# Patient Record
Sex: FEMALE | Race: BLACK OR AFRICAN AMERICAN | Employment: FULL TIME | URBAN - METROPOLITAN AREA
[De-identification: names, ages, dates, MRNs, and addresses within clinical notes are randomized per-mention and may not be internally consistent; named-entity substitution may affect disease eponyms.]

---

## 2019-03-19 ENCOUNTER — HOSPITAL ENCOUNTER (OUTPATIENT)
Dept: PHYSICAL THERAPY | Age: 54
Discharge: HOME | End: 2019-03-19
Payer: COMMERCIAL

## 2019-03-19 PROCEDURE — 97110 THERAPEUTIC EXERCISES: CPT

## 2019-03-19 PROCEDURE — 97161 PT EVAL LOW COMPLEX 20 MIN: CPT

## 2019-03-19 NOTE — PROGRESS NOTES
PT INITIAL EVALUATION NOTE - [facility] 2-15 Patient Name: Horacio Cochran Date:3/19/2019 : 1965 [x]  Patient  Verified Payor: BLUE CROSS / Plan: [place] / Product Type: PPO / In time:1:54 pm  Out time:2:48 pm 
Total Treatment Time (min): 54 minutes Total Timed Codes (min): 13 minutes 1:1 Treatment Time (MC only): 13 minutes Visit #: 1 Treatment Area: Neck pain [M54.2] SUBJECTIVE Pain Level (0-10 scale): 8/10 Any medication changes, allergies to medications, adverse drug reactions, diagnosis change, or new procedure performed?: [] No    [x] Yes (see summary sheet for update) Subjective: Pt was referred to skilled PT for right shoulder pain. Today, Pt reports primary complaints of neck and right superior shoulder pain. She also experiences tightness in her R arm and numbness/tingling in RUE down to right hand, particularly into the 5th digit. She also notices intermittent burning sensation in right scapular region. She reports recent increased RUE and  weakness. Mechanism of Injury: Insidious left low back pain began initially 2 weeks ago; 2 days later, her R arm started to ache and noticed increased tenseness within neck/bilateral shoulders followed by severe pain in upper back/scapular region. Another 2 days later, she went to [facility] who performed x-rays of neck and shoulder which were negative. She no longer has any low back pain. Aggravating factors include lying on R>L sides, extending neck, prolonged sitting, early morning, typing. Relieving factors include lying supine, medication somewhat (Gabapentin and Naproxen). PLOF: Sedentary lifestyle. 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   
Gait and Functional Mobility:  -- 
Palpation: TTP T1>C6-C7 spinous processes; TTP R superior shoulder, infraspinatus, cervical paraspinals R>L  Strength 
L: 47.4, 45.6, 41.3 (44.7) 
R: 19.4, 22.9, 22.2 (21.5) Cervical AROM:   
    R    L Flexion    WNL pulling R neck Extension   20 p! R neck Side Bending   25    25 pulling on R Rotation   50 pinch R neck  55 pulling R neck R Shoulder ROM:   AROM     PROM Flexion   125 RUE (WNL on L)   WNL Abduction  97 p! R scapular region (WNL L) WNL    
 IR   Hand to T10 (T7 on L) ER   Hand to T2 (T5 on L) UPPER QUARTER   MUSCLE STRENGTH 
KEY       R  L 
0 - No Contraction  C1, C2 Neck Flex 5  5 
1 - Trace   C3 Side Flex  5  5 
2 - Poor   C4 Sh Elev  5  5 
3 - Fair    C5 Deltoid/Biceps 4   5 
4 - Good   C6 Wrist Ext  4+  5 
5 - Normal   C7 Triceps   4  5 
    C7 Wrist Flex  4-  5 C8 Thumb Ext  3  5 
    T1 Hand Inst  3  5 Flexibility: Decreased upper trap flexibility bilaterally R>L Mobility Assessment: Decreased mid-lower thoracic spine mobility Neurological: Reflexes / Sensations: Will assess next visit Special Tests: Cervical Distraction: (-)  Cervical Compression: (-) Spurling Test: (+) Burning superior R shoulder   
  (+) Ulnar and median nerve ULTT; (-) Radial  
 
13 min Therapeutic Exercise:  [] See flow sheet :  
Rationale: increase ROM and increase strength to improve the patients ability to perform ADLs with decreased pain or discomfort. With 
 [x] TE 
 [] TA 
 [] neuro 
 [] other: Patient Education: [x] Review HEP [] Progressed/Changed HEP based on:  
[] positioning   [] body mechanics   [] transfers   [] heat/ice application   
[x] other: Educated Pt regarding impairments, role of PT, and POC. Provided Pt education regarding postural awareness and impact on her condition. Other Objective/Functional Measures: FOTO Score:  
Shoulder: 54/100 Neck: 68/100 Pain Level (0-10 scale) post treatment: 7/10 ASSESSMENT/Changes in Function:  
 
[x]  See Plan of Care [facility] 3/19/2019

## 2019-03-19 NOTE — PROGRESS NOTES
[facility] Physical Therapy [id number] [address], [address] Phone: [phone]  Fax: [phone] Plan of Care/Statement of Necessity for Physical Therapy Services  2-15 Patient name: Livier Soler  : 1965  Provider#: [id number] Referral source: Satish Quiroz [facility]/Treatment Diagnosis: Neck pain [M54.2] Prior Hospitalization: see medical history Comorbidities: Back pain, BMI>30, Diabetes Type II 
Prior Level of Function: Patient completed 20 minutes of exercise seldom or never. Medications: Verified on Patient Summary List 
 
Start of Care: 3/19/19      Onset Date: 2 weeks prior The Plan of Care and following information is based on the information from the initial evaluation. Assessment/ key information: Pt is a pleasant 52year old female who was referred to skilled PT for cervical radiculopathy. Based on examination, patient presents with symptoms consistent with right-sided C7-T1 cervical radiculopathy and postural syndrome which have resulted in impaired sensation, upper extremity and  strength, as well as cervical/glenohumeral ROM. Evaluation Complexity History MEDIUM  Complexity : 1-2 comorbidities / personal factors will impact the outcome/ POC ; Examination MEDIUM Complexity : 3 Standardized tests and measures addressing body structure, function, activity limitation and / or participation in recreation  ;Presentation LOW Complexity : Stable, uncomplicated  ;Clinical Decision Making MEDIUM Complexity : FOTO score of 26-74 Overall Complexity Rating: LOW Problem List: pain affecting function, decrease ROM, decrease strength, decrease ADL/ functional abilitiies, decrease activity tolerance and decrease flexibility/ joint mobility Treatment Plan may include any combination of the following: Therapeutic exercise, Therapeutic activities, Neuromuscular re-education, Physical agent/modality, Manual therapy, Patient education and Functional mobility training Patient / Family readiness to learn indicated by: asking questions, trying to perform skills and interest 
Persons(s) to be included in education: patient (P) Barriers to Learning/Limitations: None Patient Goal (s): Pain relief and feeling back in hand Patient Self Reported Health Status: good Rehabilitation Potential: good Short Term Goals: To be accomplished in 6 treatments: 1. Pt will be independent and compliant with HEP. 2. Pt will report improved sitting and standing postural awareness. 3. Pt will increase right glenohumeral abduction AROM to >/= 115 degrees. 4. Pt will increase right glenohumeral flexion AROM to >/= 140 degrees. Long Term Goals: To be accomplished in 12 treatments: 1. Pt will be independent and compliant with HEP. 2. Pt will improve FOTO score by the MCID from 68/100 to 79/100 demonstrating improved overall function with decreased pain or discomfort. 3. Pt will improve right  strength from 21.5# to an average of >/= 35#. 4. Pt will increase right glenohumeral flexion/abduction AROM to >/= 150 degrees. 5. Pt will increase cervical rotation to >/= 65 degrees bilaterally. 6.  Pt will be able to tolerate full work shifts including typing without complaints of neck pain or radicular symptoms. Frequency / Duration: Patient to be seen 1-2 times per week for 12 treatments. Patient/ Caregiver education and instruction: self care, activity modification and exercises 
 
[x]  Plan of care has been reviewed with JILLIAN Grimes 3/19/2019  
 
________________________________________________________________________ I certify that the above Therapy Services are being furnished while the patient is under my care. I agree with the treatment plan and certify that this therapy is necessary. [de-identified] Signature:____________________  Date:____________Time: _________

## 2019-03-22 ENCOUNTER — HOSPITAL ENCOUNTER (OUTPATIENT)
Dept: PHYSICAL THERAPY | Age: 54
Discharge: HOME | End: 2019-03-22
Payer: COMMERCIAL

## 2019-03-22 PROCEDURE — 97110 THERAPEUTIC EXERCISES: CPT

## 2019-03-22 PROCEDURE — 97140 MANUAL THERAPY 1/> REGIONS: CPT

## 2019-03-22 NOTE — PROGRESS NOTES
PT DAILY TREATMENT NOTE - [facility] 2-15 Patient Name: Horacio Cochran Date:3/22/2019 : 1965 [x]  Patient  Verified Payor: BLUE CROSS / [place]: [place] [employer] / Product Type: PPO / In time:9:35 am  Out time:10:44 am 
Total Treatment Time (min): 69 minutes Total Timed Codes (min): 59 minutes 1:1 Treatment Time (MC only): 55 minutes Visit #: 2 Treatment Area: Neck pain [M54.2] Right shoulder pain [M25.511] SUBJECTIVE Pain Level (0-10 scale): 10/10 Any medication changes, allergies to medications, adverse drug reactions, diagnosis change, or new procedure performed?: [x] No    [] Yes (see summary sheet for update) Subjective functional status/changes:   [] No changes reported Pt reports \"a ball of fire\"/burning sensation in right posterior shoulder and pain traveling down RUE to right wrist with numbness in pinky and tingling in 4th finger for the past couple of days. She notes her neck pain and movement has gotten better. She did her exercises 1 time since evaluation. OBJECTIVE Modality rationale: decrease pain and increase tissue extensibility to improve the patients ability to perform ADLs with decreased pain or discomfort. Min Type Additional Details  
  
 [] Estim: []Att   []Unatt    []TENS instruct []IFC  []Premod   []NMES []Other:  []w/US   []w/ice   []w/heat Position: Location:  
  
 []  Traction: [] Cervical       []Lumbar 
                     [] Prone          []Supine []Intermittent   []Continuous Lbs: 
[] before manual 
[] after manual 
[]w/heat  
 []  Ultrasound: []Continuous   [] Pulsed  
                    at: []1MHz   []3MHz Location: 
W/cm2:  
 [] Paraffin Location:  
[]w/heat  
10 []  Ice     [x]  Heat 
[]  Ice massage Position: Supine Location: Cervical  
 []  Laser 
[]  Other: Position: Location:  
 
 []  Vasopneumatic Device Pressure:       [] lo [] med [] hi  
Temperature: [x] Skin assessment post-treatment:  [x]intact []redness- no adverse reaction 
  []redness  adverse reaction:  
 
42 min Therapeutic Exercise:  [] See flow sheet :  
Rationale: increase ROM and increase strength to improve the patients ability to perform ADLs with decreased pain or discomfort. 17 min Manual Therapy: Cervical traction; MFR of right upper trap; ulnar and median passive nerve glide Rationale: decrease pain, increase ROM and increase tissue extensibility to improve the patients ability to perform ADLs with decreased pain or discomfort. With 
 [x] TE 
 [] TA 
 [] neuro 
 [] other: Patient Education: [x] Review HEP [] Progressed/Changed HEP based on:  
[] positioning   [] body mechanics   [] transfers   [] heat/ice application   
[] other:   
 
Other Objective/Functional Measures:   
 
Pain Level (0-10 scale) post treatment: 8/10 ASSESSMENT/Changes in Function:  
Pt noted increased irritation in elbow with ulnar passive and active nerve glides, though decreased symptomatic pain more distal in right forearm, evidence of centralizing symptoms. Corrected technique for cervical retractions to avoid lifting head off table and to push head posteriorly while maintaining chin tuck. Added prone scapular retractions and standing rows/shoulder extensions to strengthen periscapular musculature and facilitate improved sitting/standing posture. She finished session without any symptoms in right forearm, though still throbbing pain in right elbow and mild discomfort in medial/posterior upper arm. Patient will continue to benefit from skilled PT services to modify and progress therapeutic interventions, address functional mobility deficits, address ROM deficits, address strength deficits, analyze and address soft tissue restrictions, analyze and cue movement patterns, analyze and modify body mechanics/ergonomics and assess and modify postural abnormalities to attain remaining goals. [x]  See Plan of Care 
[]  See progress note/recertification 
[]  See Discharge Summary Progress towards goals / Updated goals: 
Short Term Goals: To be accomplished in 6 treatments: 1. Pt will be independent and compliant with HEP. 2. Pt will report improved sitting and standing postural awareness. 3. Pt will increase right glenohumeral abduction AROM to >/= 115 degrees. 4. Pt will increase right glenohumeral flexion AROM to >/= 140 degrees. Long Term Goals: To be accomplished in 12 treatments: 1. Pt will be independent and compliant with HEP. 2. Pt will improve FOTO score by the MCID from 68/100 to 79/100 demonstrating improved overall function with decreased pain or discomfort. 3. Pt will improve right  strength from 21.5# to an average of >/= 35#. 4. Pt will increase right glenohumeral flexion/abduction AROM to >/= 150 degrees. 5. Pt will increase cervical rotation to >/= 65 degrees bilaterally. 6.  Pt will be able to tolerate full work shifts including typing without complaints of neck pain or radicular symptoms. PLAN [x]  Upgrade activities as tolerated     [x]  Continue plan of care 
[]  Update interventions per flow sheet      
[]  Discharge due to:_ 
[]  Other:_ Perry Barros 3/22/2019

## 2019-03-25 ENCOUNTER — HOSPITAL ENCOUNTER (OUTPATIENT)
Dept: PHYSICAL THERAPY | Age: 54
Discharge: HOME | End: 2019-03-25
Payer: COMMERCIAL

## 2019-03-25 PROCEDURE — 97140 MANUAL THERAPY 1/> REGIONS: CPT

## 2019-03-25 PROCEDURE — 97014 ELECTRIC STIMULATION THERAPY: CPT

## 2019-03-25 PROCEDURE — 97110 THERAPEUTIC EXERCISES: CPT

## 2019-03-25 NOTE — PROGRESS NOTES
PT DAILY TREATMENT NOTE - [facility] 2-15 Patient Name: Mitch Jackson Date:3/25/2019 : 1965 [x]  Patient  Verified Payor: BLUE CROSS / [place]: [place] [facility] / Product Type: PPO / In time:10:40 am  Out time:11:58 am 
Total Treatment Time (min): 78 minutes Total Timed Codes (min): 63 minutes 1:1 Treatment Time ( only): 43 minutes Visit #: 3 Treatment Area: Neck pain [M54.2] SUBJECTIVE Pain Level (0-10 scale): 5.5/10 Any medication changes, allergies to medications, adverse drug reactions, diagnosis change, or new procedure performed?: [x] No    [] Yes (see summary sheet for update) Subjective functional status/changes:   [] No changes reported Pt reports she woke up last night due to throbbing pain in right elbow; she notes continued burning in right posterior shoulder. She still experiences complete numbness in 5th digit and lateral 4th digit. Symptoms are worse with prolonged sitting. OBJECTIVE Modality rationale: decrease pain and increase tissue extensibility to improve the patients ability to perform ADLs with decreased pain or discomfort. Min Type Additional Details 15 [x] Estim: []Att   [x]Unatt    [x]TENS instruct [x]IFC  []Premod   []NMES []Other:  []w/US   []w/ice   [x]w/heat Position: Supine Location: Cervical   
  
 []  Traction: [] Cervical       []Lumbar 
                     [] Prone          []Supine []Intermittent   []Continuous Lbs: 
[] before manual 
[] after manual 
[]w/heat  
 []  Ultrasound: []Continuous   [] Pulsed  
                    at: []1MHz   []3MHz Location: 
W/cm2:  
 [] Paraffin Location:  
[]w/heat  
 []  Ice     []  Heat 
[]  Ice massage Position: Location:  
 []  Laser 
[]  Other: Position: Location:  
 
 []  Vasopneumatic Device Pressure:       [] lo [] med [] hi  
Temperature:   
 
[x] Skin assessment post-treatment:  [x]intact []redness- no adverse reaction 
  []redness  adverse reaction:  
 
46 min Therapeutic Exercise:  [] See flow sheet :  
Rationale: increase ROM and increase strength to improve the patients ability to perform ADLs with decreased pain or discomfort. 17 min Manual Therapy: Cervical traction; MFR of right upper trap; ulnar and median passive nerve glide; STM and MFR of right infraspinatus in prone Rationale: decrease pain, increase ROM and increase tissue extensibility to improve the patients ability to perform ADLs with decreased pain or discomfort. With 
 [x] TE 
 [] TA 
 [] neuro 
 [] other: Patient Education: [x] Review HEP [] Progressed/Changed HEP based on:  
[] positioning   [] body mechanics   [] transfers   [] heat/ice application   
[] other:   
 
Other Objective/Functional Measures:   
 
Pain Level (0-10 scale) post treatment: 4/10 ASSESSMENT/Changes in Function:  
Corrected form with cervical retractions with verbal/tactile cues to maintain chin tuck, to properly activate deep neck flexors. Added pec stretch to promote better posture and decrease rounded shoulders posture. Also added side-lying ER to target infraspinatus musculature and facilitate healing/strengthening of tissue; Pt TTP in musculature and performed STM to decrease burning sensation and referred pain. Patient will continue to benefit from skilled PT services to modify and progress therapeutic interventions, address functional mobility deficits, address ROM deficits, address strength deficits, analyze and address soft tissue restrictions, analyze and cue movement patterns, analyze and modify body mechanics/ergonomics and assess and modify postural abnormalities to attain remaining goals. [x]  See Plan of Care 
[]  See progress note/recertification 
[]  See Discharge Summary Progress towards goals / Updated goals: 
Short Term Goals: To be accomplished in 6 treatments: 1. Pt will be independent and compliant with HEP. 2. Pt will report improved sitting and standing postural awareness. 3. Pt will increase right glenohumeral abduction AROM to >/= 115 degrees. 4. Pt will increase right glenohumeral flexion AROM to >/= 140 degrees. Long Term Goals: To be accomplished in 12 treatments: 1. Pt will be independent and compliant with HEP. 2. Pt will improve FOTO score by the MCID from 68/100 to 79/100 demonstrating improved overall function with decreased pain or discomfort. 3. Pt will improve right  strength from 21.5# to an average of >/= 35#. 4. Pt will increase right glenohumeral flexion/abduction AROM to >/= 150 degrees. 5. Pt will increase cervical rotation to >/= 65 degrees bilaterally. 6.  Pt will be able to tolerate full work shifts including typing without complaints of neck pain or radicular symptoms. PLAN [x]  Upgrade activities as tolerated     [x]  Continue plan of care 
[]  Update interventions per flow sheet      
[]  Discharge due to:_ 
[]  [facility]

## 2019-03-29 ENCOUNTER — HOSPITAL ENCOUNTER (OUTPATIENT)
Dept: PHYSICAL THERAPY | Age: 54
Discharge: HOME | End: 2019-03-29
Payer: COMMERCIAL

## 2019-03-29 PROCEDURE — 97110 THERAPEUTIC EXERCISES: CPT

## 2019-03-29 PROCEDURE — 97140 MANUAL THERAPY 1/> REGIONS: CPT

## 2019-03-29 PROCEDURE — 97014 ELECTRIC STIMULATION THERAPY: CPT
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
## 2019-04-02 ENCOUNTER — HOSPITAL ENCOUNTER (OUTPATIENT)
Dept: PHYSICAL THERAPY | Age: 54
Discharge: HOME | End: 2019-04-02
Payer: COMMERCIAL

## 2019-04-02 PROCEDURE — 97140 MANUAL THERAPY 1/> REGIONS: CPT

## 2019-04-02 PROCEDURE — 97014 ELECTRIC STIMULATION THERAPY: CPT

## 2019-04-02 PROCEDURE — 97110 THERAPEUTIC EXERCISES: CPT

## 2019-04-02 NOTE — PROGRESS NOTES
PT DAILY TREATMENT NOTE - [facility] -15 Patient Name: Hang Boss Date:2019 : 1965 [x]  Patient  Verified Payor: BLUE CROSS / Plan: [facility] [employer] / Product Type: PPO / In time:4:13P  Out time:5:07P Total Treatment Time (min): 54 Total Timed Codes (min): 39 
1:1 Treatment Time ( [address] only): 29 Visit #: 4 Treatment Area: Neck pain [M54.2] SUBJECTIVE Pain Level (0-10 scale): 8.5/10 Any medication changes, allergies to medications, adverse drug reactions, diagnosis change, or new procedure performed?: [x] No    [] Yes (see summary sheet for update) Subjective functional status/changes:   [] No changes reported Pt reports that she had some relief for a few hours after last session. Pt reported last  she felt an increase in pressure in her hand \"like someone is filling her hand up with air\". Still having numbness and tingling in lat 2 digits. OBJECTIVE Modality rationale: decrease pain and increase tissue extensibility to improve the patients ability to perform ADLs with decreased pain or discomfort. Min Type Additional Details 15 [x] Estim: []Att   [x]Unatt    [x]TENS instruct [x]IFC  []Premod   []NMES []Other:  []w/US   []w/ice   [x]w/heat Position: Supine Location: Cervical   
  
 []  Traction: [] Cervical       []Lumbar 
                     [] Prone          []Supine []Intermittent   []Continuous Lbs: 
[] before manual 
[] after manual 
[]w/heat  
 []  Ultrasound: []Continuous   [] Pulsed  
                    at: []1MHz   []3MHz Location: 
W/cm2:  
 [] Paraffin Location:  
[]w/heat  
 []  Ice     []  Heat 
[]  Ice massage Position: Location:  
 []  Laser 
[]  Other: Position: Location:  
 
 []  Vasopneumatic Device Pressure:       [] lo [] med [] hi  
Temperature:   
 
[x] Skin assessment post-treatment:  [x]intact []redness- no adverse reaction 
  []redness  adverse reaction: 14 min Therapeutic Exercise:  [x] See flow sheet :   
Rationale: increase ROM and increase strength to improve the patients ability to perform ADLs with decreased pain or discomfort. 15 min Manual Therapy: Cervical traction; MFR of right upper trap; ulnar and median passive nerve glide; STM and MFR of right infraspinatus Rationale: decrease pain, increase ROM and increase tissue extensibility to improve the patients ability to perform ADLs with decreased pain or discomfort. With 
 [x] TE 
 [] TA 
 [] neuro 
 [] other: Patient Education: [x] Review HEP [] Progressed/Changed HEP based on:  
[] positioning   [] body mechanics   [] transfers   [] heat/ice application   
[] other:   
 
Other Objective/Functional Measures: --  
 
Pain Level (0-10 scale) post treatment: 6/10 ASSESSMENT/Changes in Function:  
Pt reported relief in pain with manual but no change in radicular symptoms. Pt stated she has a FU with MD on Friday and will see about next steps. Patient will continue to benefit from skilled PT services to modify and progress therapeutic interventions, address functional mobility deficits, address ROM deficits, address strength deficits, analyze and address soft tissue restrictions, analyze and cue movement patterns, analyze and modify body mechanics/ergonomics and assess and modify postural abnormalities to attain remaining goals. [x]  See Plan of Care 
[]  See progress note/recertification 
[]  See Discharge Summary Progress towards goals / Updated goals: 
Short Term Goals: To be accomplished in 6 treatments: 1. Pt will be independent and compliant with HEP. 2. Pt will report improved sitting and standing postural awareness. 3. Pt will increase right glenohumeral abduction AROM to >/= 115 degrees. 4. Pt will increase right glenohumeral flexion AROM to >/= 140 degrees. Long Term Goals: To be accomplished in 12 treatments: 1. Pt will be independent and compliant with HEP. 2. Pt will improve FOTO score by the MCID from 68/100 to 79/100 demonstrating improved overall function with decreased pain or discomfort. 3. Pt will improve right  strength from 21.5# to an average of >/= 35#. 4. Pt will increase right glenohumeral flexion/abduction AROM to >/= 150 degrees. 5. Pt will increase cervical rotation to >/= 65 degrees bilaterally. 6.  Pt will be able to tolerate full work shifts including typing without complaints of neck pain or radicular symptoms. PLAN [x]  Upgrade activities as tolerated     [x]  Continue plan of care 
[]  Update interventions per flow sheet      
[]  Discharge due to:_ 
[]  Other:_ Narda Enamorado PTA, OPT[facility] 4/2/2019

## 2019-04-03 ENCOUNTER — APPOINTMENT (OUTPATIENT)
Dept: PHYSICAL THERAPY | Age: 54
End: 2019-04-03
Payer: COMMERCIAL
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
## 2019-04-05 ENCOUNTER — APPOINTMENT (OUTPATIENT)
Dept: PHYSICAL THERAPY | Age: 54
End: 2019-04-05
Payer: COMMERCIAL

## 2019-04-09 ENCOUNTER — HOSPITAL ENCOUNTER (OUTPATIENT)
Dept: PHYSICAL THERAPY | Age: 54
Discharge: HOME | End: 2019-04-09
Payer: COMMERCIAL

## 2019-04-09 PROCEDURE — 97140 MANUAL THERAPY 1/> REGIONS: CPT

## 2019-04-09 NOTE — PROGRESS NOTES
PT DAILY TREATMENT NOTE - [facility] 2-15 Patient Name: Kevin Fletcher Date:2019 : 1965 [x]  Patient  Verified Payor: BLUE CROSS / Plan: [facility] BL[facility]OSS [employer] / Product Type: PPO / In time: 5:48P  Out time:6:40P Total Treatment Time (min): 52 minutes Total Timed Codes (min): 52 minutes 1:1 Treatment Time ( only): 25 minutes Visit #: 6 Treatment Area: Neck pain [M54.2] SUBJECTIVE Pain Level (0-10 scale): 7/10 Any medication changes, allergies to medications, adverse drug reactions, diagnosis change, or new procedure performed?: [x] No    [] Yes (see summary sheet for update) Subjective functional status/changes:   [] No changes reported Pt reports she has an MRI scheduled for Thursday. She woke up last night at 1 am due to severe throbbing pain in right elbow down to hand and was unable to get back to sleep due to discomfort. She continues to having burning pain in right>left shoulders. OBJECTIVE 27 min Therapeutic Exercise:  [x] See flow sheet :   
Rationale: increase ROM and increase strength to improve the patients ability to perform ADLs with decreased pain or discomfort. 25 min Manual Therapy: Cervical traction; MFR of right upper trap and SCM/scalenes; ulnar and median passive nerve glides in supine Rationale: decrease pain, increase ROM and increase tissue extensibility to improve the patients ability to perform ADLs with decreased pain or discomfort. With 
 [x] TE 
 [] TA 
 [] neuro 
 [] other: Patient Education: [x] Review HEP [] Progressed/Changed HEP based on:  
[] positioning   [] body mechanics   [] transfers   [] heat/ice application   
[] other:   
 
Other Objective/Functional Measures: --  
 
Pain Level (0-10 scale) post treatment: 6/10 ASSESSMENT/Changes in Function:  
Manual treatment helped relieve severity of elbow pain, but numbness/hypersensitivity remained in the 5th>4th digits.  Reviewed ulnar nerve glides with cuing to perform in single-joint fashion, increasing shoulder abduction as tolerated. Will assess results of MRI, confer with MD, and update POC accordingly. Patient will continue to benefit from skilled PT services to modify and progress therapeutic interventions, address functional mobility deficits, address ROM deficits, address strength deficits, analyze and address soft tissue restrictions, analyze and cue movement patterns, analyze and modify body mechanics/ergonomics and assess and modify postural abnormalities to attain remaining goals. [x]  See Plan of Care 
[]  See progress note/recertification 
[]  See Discharge Summary Progress towards goals / Updated goals: 
Short Term Goals: To be accomplished in 6 treatments: 1. Pt will be independent and compliant with HEP. 2. Pt will report improved sitting and standing postural awareness. 3. Pt will increase right glenohumeral abduction AROM to >/= 115 degrees. 4. Pt will increase right glenohumeral flexion AROM to >/= 140 degrees. Long Term Goals: To be accomplished in 12 treatments: 1. Pt will be independent and compliant with HEP. 2. Pt will improve FOTO score by the MCID from 68/100 to 79/100 demonstrating improved overall function with decreased pain or discomfort. 3. Pt will improve right  strength from 21.5# to an average of >/= 35#. 4. Pt will increase right glenohumeral flexion/abduction AROM to >/= 150 degrees. 5. Pt will increase cervical rotation to >/= 65 degrees bilaterally. 6.  Pt will be able to tolerate full work shifts including typing without complaints of neck pain or radicular symptoms. PLAN [x]  Upgrade activities as tolerated     [x]  Continue plan of care 
[]  Update interventions per flow sheet      
[]  Discharge due to:_ 
[]  Other:_   
 
 Monserrat Rodriguez, PT, DPT 4/9/2019

## 2019-04-11 ENCOUNTER — HOSPITAL ENCOUNTER (OUTPATIENT)
Dept: PHYSICAL THERAPY | Age: 54
Discharge: HOME | End: 2019-04-11
Payer: COMMERCIAL

## 2019-04-11 PROCEDURE — 97140 MANUAL THERAPY 1/> REGIONS: CPT

## 2019-04-11 NOTE — PROGRESS NOTES
PT DAILY TREATMENT NOTE - [facility] 2-15 Patient Name: Maryse Esquivel Date:2019 : 1965 [x]  Patient  Verified Payor: BLUE CROSS / Plan: [facility] BLUE [place]OSS [employer] / Product Type: PPO / In time: 5:49 P  Out time: 6:40 P Total Treatment Time (min): 51 minutes Total Timed Codes (min): 51 minutes 1:1 Treatment Time ([facility] only): 30 minutes Visit #: 3 Treatment Area: Neck pain [M54.2] SUBJECTIVE Pain Level (0-10 scale): 7/10 Any medication changes, allergies to medications, adverse drug reactions, diagnosis change, or new procedure performed?: [x] No    [] Yes (see summary sheet for update) Subjective functional status/changes:   [] No changes reported Pt had her MRI this morning at [facility] and feels \"out of it\" as a result. She has felt about the same and only experienced temporary relief after last session. OBJECTIVE 26 min Therapeutic Exercise:  [x] See flow sheet :   
Rationale: increase ROM and increase strength to improve the patients ability to perform ADLs with decreased pain or discomfort. 25 min Manual Therapy: Cervical traction; MFR of right upper trap and SCM/scalenes; ulnar and median passive nerve glides in supine; Grade III P-A mobilizations of C7 and T1 spinous processes in prone Rationale: decrease pain, increase ROM and increase tissue extensibility to improve the patients ability to perform ADLs with decreased pain or discomfort. With 
 [x] TE 
 [] TA 
 [] neuro 
 [] other: Patient Education: [x] Review HEP [] Progressed/Changed HEP based on:  
[] positioning   [] body mechanics   [] transfers   [] heat/ice application   
[] other:   
 
Other Objective/Functional Measures: --  
 
Pain Level (0-10 scale) post treatment: 5/10 ASSESSMENT/Changes in Function:  
Pt noted significant tenderness with palpation of C7 and T1 spinous processes in prone, though was able to tolerate grade III P-A mobilizations and reported relieved tension/irritation in right forearm and tingling in 4th/5th digits post-manual therapy today. Attempted repetitive movements with cervical extension as she noted increased radiating symptoms with cervical flexion; will continue to monitor response to repetitive movement going forward. Patient will continue to benefit from skilled PT services to modify and progress therapeutic interventions, address functional mobility deficits, address ROM deficits, address strength deficits, analyze and address soft tissue restrictions, analyze and cue movement patterns, analyze and modify body mechanics/ergonomics and assess and modify postural abnormalities to attain remaining goals. [x]  See Plan of Care 
[]  See progress note/recertification 
[]  See Discharge Summary Progress towards goals / Updated goals: 
Short Term Goals: To be accomplished in 6 treatments: 1. Pt will be independent and compliant with HEP. 2. Pt will report improved sitting and standing postural awareness. 3. Pt will increase right glenohumeral abduction AROM to >/= 115 degrees. 4. Pt will increase right glenohumeral flexion AROM to >/= 140 degrees. Long Term Goals: To be accomplished in 12 treatments: 1. Pt will be independent and compliant with HEP. 2. Pt will improve FOTO score by the MCID from 68/100 to 79/100 demonstrating improved overall function with decreased pain or discomfort. 3. Pt will improve right  strength from 21.5# to an average of >/= 35#. 4. Pt will increase right glenohumeral flexion/abduction AROM to >/= 150 degrees. 5. Pt will increase cervical rotation to >/= 65 degrees bilaterally. 6.  Pt will be able to tolerate full work shifts including typing without complaints of neck pain or radicular symptoms.  
              
 
PLAN 
 [x]  Upgrade activities as tolerated     [x]  Continue plan of care 
[]  Update interventions per flow sheet      
[]  Discharge due to:_ 
[]  Other:_ Denise Mora PT, DPT 4/11/2019

## 2019-04-15 ENCOUNTER — HOSPITAL ENCOUNTER (OUTPATIENT)
Dept: PHYSICAL THERAPY | Age: 54
End: 2019-04-15
Payer: COMMERCIAL

## 2019-04-18 ENCOUNTER — HOSPITAL ENCOUNTER (OUTPATIENT)
Dept: PHYSICAL THERAPY | Age: 54
Discharge: HOME | End: 2019-04-18
Payer: COMMERCIAL

## 2019-04-18 PROCEDURE — 97140 MANUAL THERAPY 1/> REGIONS: CPT

## 2019-04-18 PROCEDURE — 97110 THERAPEUTIC EXERCISES: CPT

## 2019-04-18 NOTE — PROGRESS NOTES
PT DAILY TREATMENT NOTE - [facility] 2-15 Patient Name: Leigh Dixon Date:2019 : 1965 [x]  Patient  Verified Payor: BLUE CROSS / Plan: [facility] BLUE CROSS [employer] / Product Type: PPO / In time: 11:35 am  Out time: 12:50 P Total Treatment Time (min): 75 minutes Total Timed Codes (min): 75 minutes 1:1 Treatment Time ( only): 40 minutes Visit #: 0 Treatment Area: Neck pain [M54.2] SUBJECTIVE Pain Level (0-10 scale): 7/10 Any medication changes, allergies to medications, adverse drug reactions, diagnosis change, or new procedure performed?: [x] No    [] Yes (see summary sheet for update) Subjective functional status/changes:   [] No changes reported Pt reports she received her  MRI results which showed a disc hernation at C4-5 on R>L (no cervical traction/manipulation with injections). She has an appointment with with Dr. Jailyn Hastings on  at 8:00 to discuss POC and results. Pt reports her neck has been about the same, numbness in 4th/5th digits and tightness within right forearm. OBJECTIVE 50 min Therapeutic Exercise:  [x] See flow sheet :   
Rationale: increase ROM and increase strength to improve the patients ability to perform ADLs with decreased pain or discomfort. 25 min Manual Therapy: 1 and 2-joint ulnar and median passive nerve glides in supine; Grade III P-A mobilizations of C3/4, C7 and T1 spinous and right transverse processes in prone Rationale: decrease pain, increase ROM and increase tissue extensibility to improve the patients ability to perform ADLs with decreased pain or discomfort. With 
 [x] TE 
 [] TA 
 [] neuro 
 [] other: Patient Education: [x] Review HEP [] Progressed/Changed HEP based on:  
[] positioning   [] body mechanics   [] transfers   [] heat/ice application   
[] other:   
 
Other Objective/Functional Measures: --  
 
Pain Level (0-10 scale) post treatment: 5/10 ASSESSMENT/Changes in Function: Improved cervical flexion with decreased pain post-manual treatment and prone cervical extension repetitive movement. Added supine horizontal abduction in supine incorporating cervical retractions within reps. Emphasized importance of postural awareness in sitting while at work and home. Utilized model of spine to explain anatomy and results of MRI with visual.   
Patient will continue to benefit from skilled PT services to modify and progress therapeutic interventions, address functional mobility deficits, address ROM deficits, address strength deficits, analyze and address soft tissue restrictions, analyze and cue movement patterns, analyze and modify body mechanics/ergonomics and assess and modify postural abnormalities to attain remaining goals. [x]  See Plan of Care 
[]  See progress note/recertification 
[]  See Discharge Summary Progress towards goals / Updated goals: 
Short Term Goals: To be accomplished in 6 treatments: 1. Pt will be independent and compliant with HEP. 2. Pt will report improved sitting and standing postural awareness. 3. Pt will increase right glenohumeral abduction AROM to >/= 115 degrees. 4. Pt will increase right glenohumeral flexion AROM to >/= 140 degrees. Long Term Goals: To be accomplished in 12 treatments: 1. Pt will be independent and compliant with HEP. 2. Pt will improve FOTO score by the MCID from 68/100 to 79/100 demonstrating improved overall function with decreased pain or discomfort. 3. Pt will improve right  strength from 21.5# to an average of >/= 35#. 4. Pt will increase right glenohumeral flexion/abduction AROM to >/= 150 degrees. 5. Pt will increase cervical rotation to >/= 65 degrees bilaterally.  
             6.  Pt will be able to tolerate full work shifts including typing without complaints of neck pain or radicular symptoms. PLAN [x]  Upgrade activities as tolerated     [x]  Continue plan of care 
[]  Update interventions per flow sheet      
[]  Discharge due to:_ 
[]  Other:_ Kodi Hernadez PT, DPT 4/18/2019

## 2019-04-22 ENCOUNTER — HOSPITAL ENCOUNTER (OUTPATIENT)
Dept: PHYSICAL THERAPY | Age: 54
Discharge: HOME | End: 2019-04-22
Payer: COMMERCIAL

## 2019-04-22 PROCEDURE — 97140 MANUAL THERAPY 1/> REGIONS: CPT

## 2019-04-22 PROCEDURE — 97110 THERAPEUTIC EXERCISES: CPT

## 2019-04-22 NOTE — PROGRESS NOTES
PT DAILY TREATMENT NOTE - [facility] 2-15 Patient Name: Pierre Presley Date:2019 : 1965 [x]  Patient  Verified Payor: BLUE CROSS / Plan: [place] BLUE [place] [employer] / Product Type: PPO / In time: 2:25P  Out time: 3:27P Total Treatment Time (min): 62 Total Timed Codes (min): 62 
1:1 Treatment Time ([address] only): 57 Visit #: 1 Treatment Area: Neck pain [M54.2] SUBJECTIVE Pain Level (0-10 scale): 10 Any medication changes, allergies to medications, adverse drug reactions, diagnosis change, or new procedure performed?: [x] No    [] Yes (see summary sheet for update) Subjective functional status/changes:   [] No changes reported Pt reported less pain today. Numbness/tinlging still the same into her 4th and 5th digit. OBJECTIVE 27 min Therapeutic Exercise:  [x] See flow sheet : passive stretching to L cervical  SB and rotation with distraction. Rationale: increase ROM and increase strength to improve the patients ability to perform ADLs with decreased pain or discomfort. 30 min Manual Therapy: 1 and 2-joint ulnar and median passive nerve glides in supine; Grade III P-A mobilizations of C3/4, C7 and T1 spinous and right transverse processes in prone Rationale: decrease pain, increase ROM and increase tissue extensibility to improve the patients ability to perform ADLs with decreased pain or discomfort. With 
 [x] TE 
 [] TA 
 [] neuro 
 [] other: Patient Education: [x] Review HEP [] Progressed/Changed HEP based on:  
[] positioning   [] body mechanics   [] transfers   [] heat/ice application   
[] other:   
 
Other Objective/Functional Measures: --  
 
Pain Level (0-10 scale) post treatment: 3/10 ASSESSMENT/Changes in Function:  
Pt reported initial decrease in severity in numbness in 5th digit with passive L cervical rotation and distraction. Pt reported some centralization of symptoms into wrist with position.  However, after 60 seconds of holding position she reported an increase in \"heaviness\" in her 5th digit along with increase in numbness and tingling. Pt will FU with MD on Friday to discuss MRI results and future treatment options. Patient will continue to benefit from skilled PT services to modify and progress therapeutic interventions, address functional mobility deficits, address ROM deficits, address strength deficits, analyze and address soft tissue restrictions, analyze and cue movement patterns, analyze and modify body mechanics/ergonomics and assess and modify postural abnormalities to attain remaining goals. [x]  See Plan of Care 
[]  See progress note/recertification 
[]  See Discharge Summary Progress towards goals / Updated goals: 
Short Term Goals: To be accomplished in 6 treatments: 1. Pt will be independent and compliant with HEP. 2. Pt will report improved sitting and standing postural awareness. 3. Pt will increase right glenohumeral abduction AROM to >/= 115 degrees. 4. Pt will increase right glenohumeral flexion AROM to >/= 140 degrees. Long Term Goals: To be accomplished in 12 treatments: 1. Pt will be independent and compliant with HEP. 2. Pt will improve FOTO score by the MCID from 68/100 to 79/100 demonstrating improved overall function with decreased pain or discomfort. 3. Pt will improve right  strength from 21.5# to an average of >/= 35#. 4. Pt will increase right glenohumeral flexion/abduction AROM to >/= 150 degrees. 5. Pt will increase cervical rotation to >/= 65 degrees bilaterally. 6.  Pt will be able to tolerate full work shifts including typing without complaints of neck pain or radicular symptoms. PLAN [x]  Upgrade activities as tolerated     [x]  Continue plan of care 
[]  Update interventions per flow sheet []  Discharge due to:_ 
[]  Other:_ Dionicio Rich PTA, OPTA 4/22/2019

## 2019-04-24 ENCOUNTER — APPOINTMENT (OUTPATIENT)
Dept: PHYSICAL THERAPY | Age: 54
End: 2019-04-24
Payer: COMMERCIAL

## 2019-04-25 ENCOUNTER — HOSPITAL ENCOUNTER (OUTPATIENT)
Dept: PHYSICAL THERAPY | Age: 54
Discharge: HOME | End: 2019-04-25
Payer: COMMERCIAL

## 2019-04-25 PROCEDURE — 97140 MANUAL THERAPY 1/> REGIONS: CPT

## 2019-04-25 PROCEDURE — 97110 THERAPEUTIC EXERCISES: CPT

## 2019-04-25 NOTE — PROGRESS NOTES
PT DAILY TREATMENT NOTE - [facility] 2-15 Patient Name: Dave Candelario Date:2019 : 1965 [x]  Patient  Verified Payor: BLUE CROSS / [place]: [place] [facility] [place] [employer] / Product Type: PPO / In time: 2:04 P  Out time: 3:10P Total Treatment Time (min): 66 minutes Total Timed Codes (min): 66 minutes 1:1 Treatment Time ( only): 40 minutes Visit #: 30 Treatment Area: Neck pain [M54.2] SUBJECTIVE Pain Level (0-10 scale): 5/10 Any medication changes, allergies to medications, adverse drug reactions, diagnosis change, or new procedure performed?: [x] No    [] Yes (see summary sheet for update) Subjective functional status/changes:   [] No changes reported Pt reports she has been irritated over the past few days due to work. Her forearm is less irritated in recent days, though is still experiencing same numbness in 4th/5th digits. Pt meeting with spine specialist tomorrow. OBJECTIVE Modality rationale: decrease pain, increase tissue extensibility and increase muscle contraction/control to improve the patients ability to perform ADLs with decreased pain or discomfort. Min Type Additional Details 5 [x] Estim: [x]Att   []Unatt    []TENS instruct []IFC  []Premod   []NMES [x]Other: Direct []w/US   []w/ice   []w/heat Position:prone Location: R C7 and T1 multifidi   
  
 []  Traction: [] Cervical       []Lumbar 
                     [] Prone          []Supine []Intermittent   []Continuous Lbs: 
[] before manual 
[] after manual 
[]w/heat  
 []  Ultrasound: []Continuous   [] Pulsed  
                    at: []1MHz   []3MHz Location: 
W/cm2:  
 [] Paraffin Location:  
[]w/heat  
 []  Ice     []  Heat 
[]  Ice massage Position: Location:  
 []  Laser 
[]  Other: Position: Location:  
 
 []  Vasopneumatic Device Pressure:       [] lo [] med [] hi  
Temperature: [x] Skin assessment post-treatment:  [x]intact []redness- no adverse reaction 
  []redness  adverse reaction:  
 
25 min Therapeutic Exercise:  [x] See flow sheet : passive stretching to L cervical  SB and rotation with distraction. Rationale: increase ROM and increase strength to improve the patients ability to perform ADLs with decreased pain or discomfort. 36 min Manual Therapy: 1 and 2-joint ulnar and median passive nerve glides in supine; Grade III P-A mobilizations of C3/4, C7 and T1 spinous and right transverse processes in prone; Dry needling of infraspinatus and C7/T1 multifidi bilaterally Rationale: decrease pain, increase ROM and increase tissue extensibility to improve the patients ability to perform ADLs with decreased pain or discomfort. With 
 [x] TE 
 [] TA 
 [] neuro 
 [] other: Patient Education: [x] Review HEP [] Progressed/Changed HEP based on:  
[] positioning   [] body mechanics   [] transfers   [] heat/ice application   
[x] other: Pt provided verbal and written consent to dry needle treatment during today's session. Pt was educated regarding rationale, effects, and potential adverse effects of dry needling as well as proper aftercare; Pt verbalized understanding. Other Objective/Functional Measures: Myotome Motor Recruitment: (PRE-DN) R  L C5 (Deltoid)  5  5 C5 (Infraspinatus) 5  5 
 C6 (Biceps)  5  5 
 C6 (Subscap)  5  5 
 C6 (Wrist EXT) 4  5 
 C7 (Triceps)  4  5 
 C7 (FCU)  4-  5 C8 (EPL)  3  5 
 T1 (Interossei)  NT: hypersensitive lateral 5th digit  5 Decreased light touch sensation medial forearm Myotome Motor Recruitment: (POST-DN) R  L 
 C6 (Wrist EXT) 5  5 
 C7 (Triceps)  5  5 
 C7 (FCU)  5  5 C8 (EPL)  3+  5 T1 (Interossei)  Decreased sensitivity  5 Pain Level (0-10 scale) post treatment: 3/10 ASSESSMENT/Changes in Function:  
Pt demonstrates significant weakness along R C8 myotome with 3/5 strength of EPL; she demonstrated mild improvement of EPL strength post-DN, though demonstrated significant improvement along C6 and C7 myotomes. Overall, she responded well to dry needling today, noting decreased pain, though continued numbness in 4th/5th digits. Patient will continue to benefit from skilled PT services to modify and progress therapeutic interventions, address functional mobility deficits, address ROM deficits, address strength deficits, analyze and address soft tissue restrictions, analyze and cue movement patterns, analyze and modify body mechanics/ergonomics and assess and modify postural abnormalities to attain remaining goals. [x]  See Plan of Care 
[]  See progress note/recertification 
[]  See Discharge Summary Progress towards goals / Updated goals: 
Short Term Goals: To be accomplished in 6 treatments: 1. Pt will be independent and compliant with HEP. 2. Pt will report improved sitting and standing postural awareness. 3. Pt will increase right glenohumeral abduction AROM to >/= 115 degrees. 4. Pt will increase right glenohumeral flexion AROM to >/= 140 degrees. Long Term Goals: To be accomplished in 12 treatments: 1. Pt will be independent and compliant with HEP. 2. Pt will improve FOTO score by the MCID from 68/100 to 79/100 demonstrating improved overall function with decreased pain or discomfort. 3. Pt will improve right  strength from 21.5# to an average of >/= 35#. 4. Pt will increase right glenohumeral flexion/abduction AROM to >/= 150 degrees. 5. Pt will increase cervical rotation to >/= 65 degrees bilaterally. 6.  Pt will be able to tolerate full work shifts including typing without complaints of neck pain or radicular symptoms. PLAN [x]  Upgrade activities as tolerated     [x]  Continue plan of care []  Update interventions per flow sheet      
[]  Discharge due to:_ 
[]  Other:_ Eulalia Dillon, PT, DPT 4/25/2019

## 2019-04-29 ENCOUNTER — HOSPITAL ENCOUNTER (OUTPATIENT)
Dept: PHYSICAL THERAPY | Age: 54
Discharge: HOME | End: 2019-04-29
Payer: COMMERCIAL

## 2019-04-29 PROCEDURE — 97140 MANUAL THERAPY 1/> REGIONS: CPT

## 2019-04-29 PROCEDURE — 97110 THERAPEUTIC EXERCISES: CPT

## 2019-04-29 NOTE — PROGRESS NOTES
PT DAILY TREATMENT NOTE - [facility] 2-15 Patient Name: Enma Jimenez Date:2019 : 1965 [x]  Patient  Verified Payor: BLUE CROSS / [place]: [place] [facility] [place] [employer] / Product Type: PPO / In time: 2:35 P  Out time: 3:36P Total Treatment Time (min): 61 minutes Total Timed Codes (min): 61 minutes 1:1 Treatment Time ( only): 44 minutes Visit #: 38 Treatment Area: Neck pain [M54.2] SUBJECTIVE Pain Level (0-10 scale): 5/10 Any medication changes, allergies to medications, adverse drug reactions, diagnosis change, or new procedure performed?: [x] No    [] Yes (see summary sheet for update) Subjective functional status/changes:   [] No changes reported Pt met with spinal surgeon who diagnosed her with cubital tunnel syndrome and potential disc protrusion at C7-T1 (after second look at MRI). Pt did not notice much of any change after dry needling last session. OBJECTIVE 25 min Therapeutic Exercise:  [x] See flow sheet : passive stretching to L cervical  SB and rotation with distraction. Rationale: increase ROM and increase strength to improve the patients ability to perform ADLs with decreased pain or discomfort. 36 min Manual Therapy: 1 and 2-joint ulnar and median passive nerve glides in supine; Grade III P-A mobilizations of C7 and T1 spinous and right transverse processes in prone; Grade V thoracic manipulations in prone Rationale: decrease pain, increase ROM and increase tissue extensibility to improve the patients ability to perform ADLs with decreased pain or discomfort. With 
 [x] TE 
 [] TA 
 [] neuro 
 [] other: Patient Education: [x] Review HEP [] Progressed/Changed HEP based on:  
[] positioning   [] body mechanics   [] transfers   [] heat/ice application   
[] other:  
  
 
Other Objective/Functional Measures:  
 
Pain Level (0-10 scale) post treatment: 3/10 ASSESSMENT/Changes in Function: Performed grade V thoracic manipulations due to postural impairment and to improve cervical radicular symptoms; produced several cavitations and Pt responded well. Added prone horizontal abduction to HEP to strengthen periscapular musculature with cues to minimize cervical and upper trap compensations. Patient will continue to benefit from skilled PT services to modify and progress therapeutic interventions, address functional mobility deficits, address ROM deficits, address strength deficits, analyze and address soft tissue restrictions, analyze and cue movement patterns, analyze and modify body mechanics/ergonomics and assess and modify postural abnormalities to attain remaining goals. [x]  See Plan of Care 
[]  See progress note/recertification 
[]  See Discharge Summary Progress towards goals / Updated goals: 
Short Term Goals: To be accomplished in 6 treatments: 1. Pt will be independent and compliant with HEP. 2. Pt will report improved sitting and standing postural awareness. 3. Pt will increase right glenohumeral abduction AROM to >/= 115 degrees. 4. Pt will increase right glenohumeral flexion AROM to >/= 140 degrees. Long Term Goals: To be accomplished in 12 treatments: 1. Pt will be independent and compliant with HEP. 2. Pt will improve FOTO score by the MCID from 68/100 to 79/100 demonstrating improved overall function with decreased pain or discomfort. 3. Pt will improve right  strength from 21.5# to an average of >/= 35#. 4. Pt will increase right glenohumeral flexion/abduction AROM to >/= 150 degrees. 5. Pt will increase cervical rotation to >/= 65 degrees bilaterally. 6.  Pt will be able to tolerate full work shifts including typing without complaints of neck pain or radicular symptoms.  
              
 
PLAN 
 [x]  Upgrade activities as tolerated     [x]  Continue plan of care 
[]  Update interventions per flow sheet      
[]  Discharge due to:_ 
[]  Other:_ Gurmeet Whittington, PT, DPT 4/29/2019

## 2019-05-02 ENCOUNTER — HOSPITAL ENCOUNTER (OUTPATIENT)
Dept: PHYSICAL THERAPY | Age: 54
End: 2019-05-02
Payer: COMMERCIAL

## 2019-05-09 ENCOUNTER — HOSPITAL ENCOUNTER (OUTPATIENT)
Dept: PHYSICAL THERAPY | Age: 54
Discharge: HOME | End: 2019-05-09
Payer: COMMERCIAL

## 2019-05-09 PROCEDURE — 97110 THERAPEUTIC EXERCISES: CPT

## 2019-05-09 PROCEDURE — 97140 MANUAL THERAPY 1/> REGIONS: CPT

## 2019-05-09 NOTE — PROGRESS NOTES
PT DAILY TREATMENT NOTE - [facility] 2-15 Patient Name: Mu Baker Date:2019 : 1965 [x]  Patient  Verified Payor: BLUE CROSS / [place]: [place] [facility] [employer] / Product Type: PPO / In time: 6:00 P  Out time: 7:07 P Total Treatment Time (min): 67 minutes Total Timed Codes (min): 67 minutes 1:1 Treatment Time (MC only): 40 minutes Visit #: 67 Treatment Area: Neck pain [M54.2] SUBJECTIVE Pain Level (0-10 scale): 0/10 Any medication changes, allergies to medications, adverse drug reactions, diagnosis change, or new procedure performed?: [x] No    [] Yes (see summary sheet for update) Subjective functional status/changes:   [] No changes reported Pt reports she still has not had any pain in forearm/wrist, just continued numbness in 5th>4th digits. OBJECTIVE 42 min Therapeutic Exercise:  [x] See flow sheet :   
Rationale: increase ROM and increase strength to improve the patients ability to perform ADLs with decreased pain or discomfort. 25 min Manual Therapy: IASTM of right wrist flexor musculature; Seated thoracic MWM; MFR and STM of upper trap, cervical PS, and scalene musculature in supine Rationale: decrease pain, increase ROM and increase tissue extensibility to improve the patients ability to perform ADLs with decreased pain or discomfort. With 
 [x] TE 
 [] TA 
 [] neuro 
 [] other: Patient Education: [x] Review HEP [] Progressed/Changed HEP based on:  
[] positioning   [] body mechanics   [] transfers   [] heat/ice application   
[] other:  
  
 
Other Objective/Functional Measures: -- 
 
Pain Level (0-10 scale) post treatment: 0/10 ASSESSMENT/Changes in Function:  
Provided IASTM to right wrist flexor musculature to decrease adherence of tissue to peripheral nerves. Also performed seated thoracic MWM to improve thoracic mobility and posture; Pt noted good stretch with this, particularly with extension+left rotation.  Added lat pull downs for periscapular strengthening and facilitation of proper sitting posture. Patient will continue to benefit from skilled PT services to modify and progress therapeutic interventions, address functional mobility deficits, address ROM deficits, address strength deficits, analyze and address soft tissue restrictions, analyze and cue movement patterns, analyze and modify body mechanics/ergonomics and assess and modify postural abnormalities to attain remaining goals. []  See Plan of Care [x]  See progress note/recertification 
[]  See Discharge Summary Progress towards goals / Updated goals: 
Short Term Goals: To be accomplished in 6 treatments: 1. Pt will be independent and compliant with HEP. - MET 2. Pt will report improved sitting and standing postural awareness. - MET (though still working on it) 3. Pt will increase right glenohumeral abduction AROM to >/= 115 degrees. - MET 4. Pt will increase right glenohumeral flexion AROM to >/= 140 degrees. - MET Long Term Goals: To be accomplished in 12 treatments: 1. Pt will be independent and compliant with HEP. - MET 2. Pt will improve FOTO score by the MCID from 68/100 to 79/100 demonstrating improved overall function with decreased pain or discomfort. 3. Pt will improve right  strength from 21.5# to an average of >/= 35#. - Progressing (32#) 4. Pt will increase right glenohumeral flexion/abduction AROM to >/= 150 degrees. - MET Flexion; Progressing Abduction 5. Pt will increase cervical rotation to >/= 65 degrees bilaterally. - MET 6.  Pt will be able to tolerate full work shifts including typing without complaints of neck pain or radicular symptoms. - Progressing (typing/moving mouse) PLAN [x]  Upgrade activities as tolerated     [x]  Continue plan of care []  Update interventions per flow sheet      
[]  Discharge due to:_ 
[]  Other:_ Le Suárez, PT, DPT 5/9/2019

## 2019-05-28 ENCOUNTER — HOSPITAL ENCOUNTER (OUTPATIENT)
Dept: PHYSICAL THERAPY | Age: 54
Discharge: HOME | End: 2019-05-28
Payer: COMMERCIAL

## 2019-05-28 PROCEDURE — 97140 MANUAL THERAPY 1/> REGIONS: CPT

## 2019-05-28 PROCEDURE — 97110 THERAPEUTIC EXERCISES: CPT

## 2019-05-28 NOTE — PROGRESS NOTES
PT DAILY TREATMENT NOTE - [facility] 2-15    Patient Name: Leigh Dixon  Date:2019  : 1965  [x]  Patient  Verified  Payor: BLUE CROSS / Plan: "[employer] [facility] [place] / Product Type: PPO /    In time: 5:38 P  Out time: 6:38 P  Total Treatment Time (min): 60 minutes  Total Timed Codes (min): 60 minutes  1:1 Treatment Time ( only): 54 minutes  Visit #: 15    Treatment Area: Neck pain [M54.2]    SUBJECTIVE  Pain Level (0-10 scale): 0/10  Any medication changes, allergies to medications, adverse drug reactions, diagnosis change, or new procedure performed?: [x] No    [] Yes (see summary sheet for update)  Subjective functional status/changes:   [] No changes reported  Pt reports she has been dealing with a lot of personal stress with issues re: adoption of her 2 children and her 's hospital stay over the weekend. She has noticed increased achiness within her right forearm and some tingling in right posterior shoulder as a result. OBJECTIVE    34 min Therapeutic Exercise:  [x] See flow sheet :    Rationale: increase ROM and increase strength to improve the patients ability to perform ADLs with decreased pain or discomfort. 26 min Manual Therapy: Passive 1 and 2-joint ulnar nerve glides in supine; MFR and STM of upper trap, cervical PS, and scalene musculature in prone; Grade III and IV thoracic mobs in prone; lateral downglides of left cervical spine with passive left SB   Rationale: decrease pain, increase ROM and increase tissue extensibility to improve the patients ability to perform ADLs with decreased pain or discomfort. With   [x] TE   [] TA   [] neuro   [] other: Patient Education: [x] Review HEP    [] Progressed/Changed HEP based on:   [] positioning   [] body mechanics   [] transfers   [] heat/ice application    [x] other: Discussed D/C next session barring any setbacks; Pt verbalized understanding and agreement.        Other Objective/Functional Measures: --    Pain Level (0-10 scale) post treatment: 0/10    ASSESSMENT/Changes in Function:   Pt responded very well to manual treatment, noting relief of tingling in right shoulder and eased right forearm ache pain. Adding 1 additional session to allow Pt to work on HEP over next week as she was in the hospital for her  all weekend. Reinforced the importance of posture and its impact with prolonged sitting in the hospital over the weekend likely contributed to her increased symptoms. Patient will continue to benefit from skilled PT services to modify and progress therapeutic interventions, address functional mobility deficits, address ROM deficits, address strength deficits, analyze and address soft tissue restrictions, analyze and cue movement patterns, analyze and modify body mechanics/ergonomics and assess and modify postural abnormalities to attain remaining goals. []  See Plan of Care  []  See progress note/recertification  [x]  See Discharge Summary         Progress towards goals / Updated goals:  Short Term Goals: To be accomplished in 6 treatments:               1. Pt will be independent and compliant with HEP. - MET               2. Pt will report improved sitting and standing postural awareness. - MET (though still working on it)               3. Pt will increase right glenohumeral abduction AROM to >/= 115 degrees. - MET               4. Pt will increase right glenohumeral flexion AROM to >/= 140 degrees. - MET  Long Term Goals: To be accomplished in 12 treatments:               1. Pt will be independent and compliant with HEP. - MET               2. Pt will improve FOTO score by the MCID from 68/100 to 79/100 demonstrating improved overall function with decreased pain or discomfort. 3. Pt will improve right  strength from 21.5# to an average of >/= 35#. - Progressing (32#)               4. Pt will increase right glenohumeral flexion/abduction AROM to >/= 150 degrees.  - MET Flexion; Progressing Abduction               5. Pt will increase cervical rotation to >/= 65 degrees bilaterally.  - MET               6.  Pt will be able to tolerate full work shifts including typing without complaints of neck pain or radicular symptoms. - Progressing (typing/moving mouse)                   PLAN  [x]  Upgrade activities as tolerated     [x]  Continue plan of care  []  Update interventions per flow sheet       []  Discharge due to:_  []  Other:_      Alessandra Howell, PT, DPT 5/28/2019

## 2019-06-04 ENCOUNTER — APPOINTMENT (OUTPATIENT)
Dept: PHYSICAL THERAPY | Age: 54
End: 2019-06-04
Payer: COMMERCIAL

## 2019-06-05 ENCOUNTER — HOSPITAL ENCOUNTER (OUTPATIENT)
Dept: PHYSICAL THERAPY | Age: 54
Discharge: HOME | End: 2019-06-05
Payer: COMMERCIAL

## 2019-06-05 PROCEDURE — 97110 THERAPEUTIC EXERCISES: CPT

## 2019-06-05 PROCEDURE — 97140 MANUAL THERAPY 1/> REGIONS: CPT

## 2019-06-05 NOTE — PROGRESS NOTES
PT DAILY TREATMENT NOTE - [facility] 2-15    Patient Name: Pierre Presley  Date:2019  : 1965  [x]  Patient  Verified  Payor: BLUE CROSS / Plan: [employer] [facility] [place] / Product Type: PPO /    In time: 3:03 P  Out time: 4:03 P  Total Treatment Time (min): 60 minutes  Total Timed Codes (min): 60 minutes  1:1 Treatment Time ( only): 55 minutes  Visit #: 16    Treatment Area: Neck pain [M54.2]    SUBJECTIVE  Pain Level (0-10 scale): 0/10  Any medication changes, allergies to medications, adverse drug reactions, diagnosis change, or new procedure performed?: [x] No    [] Yes (see summary sheet for update)  Subjective functional status/changes:   [] No changes reported  Pt reports mild ache pain in right shoulder/upper arm. She is no longer having the hypersensitivity within her 4th/5th digits. OBJECTIVE    45 min Therapeutic Exercise:  [x] See flow sheet :    Rationale: increase ROM and increase strength to improve the patients ability to perform ADLs with decreased pain or discomfort. 15 min Manual Therapy:  MFR and STM of upper trap, cervical PS, and scalene musculature in prone; Grade III and IV thoracic mobs in prone; lateral downglides of left cervical spine with passive left SB   Rationale: decrease pain, increase ROM and increase tissue extensibility to improve the patients ability to perform ADLs with decreased pain or discomfort.     With   [x] TE   [] TA   [] neuro   [] other: Patient Education: [x] Review HEP    [] Progressed/Changed HEP based on:   [] positioning   [] body mechanics   [] transfers   [] heat/ice application    [] other:        Other Objective/Functional Measures:    0-100: 99% improved  FOTO Score:   Neck: 78/100  (68 on eval)  Shoulder: 73/100 (54 on eval)       R Shoulder ROM:                  AROM         (eval vs today)                                                    Flexion                         125 RUE    165                                 Abduction 97 p! R scapular region       155                                      IR                                 Hand to T10   T7                                   ER                               Hand to T2     T4                R  strength (avg of 3 trials):  38# (21.5# on eval)     R cervical rotation: 70 (50 on eval)  L cervical rotation: 75  (55 on eval)      Pain Level (0-10 scale) post treatment: 0/10    ASSESSMENT/Changes in Function:     []  See Plan of Care  []  See progress note/recertification  [x]  See Discharge Summary         Progress towards goals / Updated goals:  Short Term Goals: To be accomplished in 6 treatments:               1. Pt will be independent and compliant with HEP. - MET               2. Pt will report improved sitting and standing postural awareness. - MET                3. Pt will increase right glenohumeral abduction AROM to >/= 115 degrees. - MET               4. Pt will increase right glenohumeral flexion AROM to >/= 140 degrees. - MET  Long Term Goals: To be accomplished in 12 treatments:               1. Pt will be independent and compliant with HEP. - MET               2. Pt will improve FOTO score by the MCID demonstrating improved overall function with decreased pain or discomfort - MET               3. Pt will improve right  strength from 21.5# to an average of >/= 35#. - MET (38#)               4. Pt will increase right glenohumeral flexion/abduction AROM to >/= 150 degrees. - MET               5. Pt will increase cervical rotation to >/= 65 degrees bilaterally.  - MET               6.  Pt will be able to tolerate full work shifts including typing without complaints of neck pain or radicular symptoms. - NOT MET (still mild discomfort/stiffness in neck and shoulders)  PLAN  []  Upgrade activities as tolerated     []  Continue plan of care  []  Update interventions per flow sheet       [x]  Discharge due to: Pt reaching or progressing towards all short and long-term goals.  []  Other:_      Duc Cabral, PT, DPT 6/5/2019

## 2019-06-05 NOTE — ANCILLARY DISCHARGE INSTRUCTIONS
[facility] Physical Therapy  [address] [address] Arleth Hernandez  Phone: [phone]  Fax: [phone]    Discharge Summary  2-15    Patient name: Tiffani Gibson  : 1965  Provider#: [id number]  Referral source: MARJORIE Harris      Medical/Treatment Diagnosis: Neck pain [M54.2]     Prior Hospitalization: see medical history     Comorbidities: Back pain, BMI>30, Diabetes Type II  Prior Level of Function: Patient completed 20 minutes of exercise seldom or never. Medications: Verified on Patient Summary List     Start of Care: 3/19/19                                                               Onset Date: 2 weeks prior             Visits from Start of Care: 16     Missed Visits: 0  Reporting Period : 3/19/19 to 19      ASSESSMENT/SUMMARY OF CARE:  Ms. Reyes Flaming was seen for a total of 16 skilled physical therapy visits secondary to neck pain and cervical radiculopathy. Pt demonstrated significant progress in the past month with her therapy program which emphasized improving sitting postural awareness, improving thoracic mobility, decreasing neural tension, decreasing tightness of cervical musculature, increasing periscapular and deep neck flexor strength via therapeutic exercise and manual therapy techniques. Pt reports feeling 99% improved since beginning treatment and increased her FOTO score from 54/100 to 73/100 demonstrating improved overall function with decreased pain. She notes that she is much more aware of her posture which has enabled her to sit for longer periods of time with less discomfort. Pt is no longer experiencing pain within her right forearm/wrist and is no longer being woken up due to altered sensation or pain throughout the night. She does still experience numbness of her 5th digit, though no longer experiences the hypersensitivity in her 4th and 5th digits. Pt has also improved her  strength from 21.5# to 38#.   Pt has been provided a comprehensive HEP in order to continue to progress independently. Thank you for this referral!        R Shoulder ROM:                  AROM         (eval vs today)                                                    INEEDDO                         694 RUE    165                                 Abduction                    97 p! R scapular region       749                                                                       EFIK to J92   D6                                   NU                               QBPE to B3     K0                 R  strength (avg of 3 trials):  38# (21.5# on eval)     R cervical rotation: 70 (50 on eval)  L cervical rotation: 75  (55 on eval)      Progress towards goals / Updated goals:  Short Term Goals: To be accomplished in 6 treatments:               1. Pt will be independent and compliant with HEP. - MET               2. Pt will report improved sitting and standing postural awareness. - MET                3. Pt will increase right glenohumeral abduction AROM to >/= 115 degrees. - MET               4. Pt will increase right glenohumeral flexion AROM to >/= 140 degrees. - MET  Long Term Goals: To be accomplished in 12 treatments:               1. Pt will be independent and compliant with HEP. - MET               2. Pt will improve FOTO score by the MCID from 68/100 to 79/100 demonstrating improved overall function with decreased pain or discomfort. MET               3. Pt will improve right  strength from 21.5# to an average of >/= 35#. - MET (38#)               4. Pt will increase right glenohumeral flexion/abduction AROM to >/= 150 degrees. - MET               9. Pt will increase cervical rotation to >/= 65 degrees bilaterally.  - MET               6.  Pt will be able to tolerate full work shifts including typing without complaints of neck pain or radicular symptoms. - NOT MET (still mild discomfort/stiffness in neck and shoulders)        RECOMMENDATIONS:  [x]Discontinue therapy: [x]Patient has reached or is progressing toward set goals      []Patient is non-compliant or has abdicated      []Due to lack of appreciable progress towards set goals    Margarete Duane 6/5/2019

## 2020-11-02 ENCOUNTER — HOSPITAL ENCOUNTER (OUTPATIENT)
Dept: PREADMISSION TESTING | Age: 55
Discharge: HOME | End: 2020-11-02
Payer: COMMERCIAL

## 2020-11-02 VITALS
OXYGEN SATURATION: 99 % | HEIGHT: 69 IN | SYSTOLIC BLOOD PRESSURE: 131 MMHG | WEIGHT: 217.59 LBS | TEMPERATURE: 98 F | DIASTOLIC BLOOD PRESSURE: 71 MMHG | RESPIRATION RATE: 16 BRPM | BODY MASS INDEX: 32.23 KG/M2

## 2020-11-02 LAB
ANION GAP SERPL CALC-SCNC: 7 MMOL/L (ref 5–15)
APPEARANCE UR: CLEAR
ATRIAL RATE: 59 BPM
BACTERIA URNS QL MICRO: NEGATIVE /HPF
BILIRUB UR QL: NEGATIVE
BUN SERPL-MCNC: 9 MG/DL (ref 6–20)
BUN/CREAT SERPL: 10 (ref 12–20)
CALCIUM SERPL-MCNC: 9 MG/DL (ref 8.5–10.1)
CALCULATED P AXIS, ECG09: 43 DEGREES
CALCULATED R AXIS, ECG10: 15 DEGREES
CALCULATED T AXIS, ECG11: 32 DEGREES
CHLORIDE SERPL-SCNC: 105 MMOL/L (ref 97–108)
CO2 SERPL-SCNC: 25 MMOL/L (ref 21–32)
COLOR UR: ABNORMAL
CREAT SERPL-MCNC: 0.88 MG/DL (ref 0.55–1.02)
DIAGNOSIS, 93000: NORMAL
EPITH CASTS URNS QL MICRO: ABNORMAL /LPF
ERYTHROCYTE [DISTWIDTH] IN BLOOD BY AUTOMATED COUNT: 15.8 % (ref 11.5–14.5)
GLUCOSE SERPL-MCNC: 185 MG/DL (ref 65–100)
GLUCOSE UR STRIP.AUTO-MCNC: NEGATIVE MG/DL
HCT VFR BLD AUTO: 33.4 % (ref 35–47)
HGB BLD-MCNC: 10 G/DL (ref 11.5–16)
HGB UR QL STRIP: ABNORMAL
HYALINE CASTS URNS QL MICRO: ABNORMAL /LPF (ref 0–5)
KETONES UR QL STRIP.AUTO: NEGATIVE MG/DL
LEUKOCYTE ESTERASE UR QL STRIP.AUTO: NEGATIVE
MCH RBC QN AUTO: 24 PG (ref 26–34)
MCHC RBC AUTO-ENTMCNC: 29.9 G/DL (ref 30–36.5)
MCV RBC AUTO: 80.3 FL (ref 80–99)
NITRITE UR QL STRIP.AUTO: NEGATIVE
NRBC # BLD: 0 K/UL (ref 0–0.01)
NRBC BLD-RTO: 0 PER 100 WBC
P-R INTERVAL, ECG05: 150 MS
PH UR STRIP: 5 [PH] (ref 5–8)
PLATELET # BLD AUTO: 292 K/UL (ref 150–400)
PMV BLD AUTO: 11.7 FL (ref 8.9–12.9)
POTASSIUM SERPL-SCNC: 4.3 MMOL/L (ref 3.5–5.1)
PROT UR STRIP-MCNC: NEGATIVE MG/DL
Q-T INTERVAL, ECG07: 434 MS
QRS DURATION, ECG06: 78 MS
QTC CALCULATION (BEZET), ECG08: 429 MS
RBC # BLD AUTO: 4.16 M/UL (ref 3.8–5.2)
RBC #/AREA URNS HPF: ABNORMAL /HPF (ref 0–5)
SODIUM SERPL-SCNC: 137 MMOL/L (ref 136–145)
SP GR UR REFRACTOMETRY: 1.02 (ref 1–1.03)
UA: UC IF INDICATED,UAUC: ABNORMAL
UROBILINOGEN UR QL STRIP.AUTO: 0.2 EU/DL (ref 0.2–1)
VENTRICULAR RATE, ECG03: 59 BPM
WBC # BLD AUTO: 6.3 K/UL (ref 3.6–11)
WBC URNS QL MICRO: ABNORMAL /HPF (ref 0–4)

## 2020-11-02 PROCEDURE — 83036 HEMOGLOBIN GLYCOSYLATED A1C: CPT

## 2020-11-02 PROCEDURE — 80048 BASIC METABOLIC PNL TOTAL CA: CPT

## 2020-11-02 PROCEDURE — 85027 COMPLETE CBC AUTOMATED: CPT

## 2020-11-02 PROCEDURE — 93005 ELECTROCARDIOGRAM TRACING: CPT

## 2020-11-02 PROCEDURE — 36415 COLL VENOUS BLD VENIPUNCTURE: CPT

## 2020-11-02 PROCEDURE — 81001 URINALYSIS AUTO W/SCOPE: CPT

## 2020-11-02 RX ORDER — LISINOPRIL 5 MG/1
TABLET ORAL DAILY
COMMUNITY

## 2020-11-02 RX ORDER — ATORVASTATIN CALCIUM 20 MG/1
20 TABLET, FILM COATED ORAL
COMMUNITY

## 2020-11-02 RX ORDER — GABAPENTIN 300 MG/1
300 CAPSULE ORAL
COMMUNITY

## 2020-11-02 RX ORDER — LANOLIN ALCOHOL/MO/W.PET/CERES
CREAM (GRAM) TOPICAL
COMMUNITY

## 2020-11-02 RX ORDER — BISMUTH SUBSALICYLATE 262 MG
1 TABLET,CHEWABLE ORAL DAILY
COMMUNITY

## 2020-11-02 NOTE — PERIOP NOTES
PREOP, VERBAL & WRITTEN INSTRUCTIONS GIVEN TO PT. PATIENT GIVEN SURGICAL SITE INFECTION FAQs HANDOUT. PT GIVEN 2 BOTTLES OF CHG SOL WITH VERBAL & WRITTEN INSTRUCTIONS. PT GIVEN OPPORTUNITY TO EXPRESS CONCERNS & ASK QUESTIONS.     PT WAS GIVEN WRITTEN & VERBAL INSTRUCTION FOR REGISTRATION PROTOCOL ON DOS, & FOR C0VID TESTIN    CALLED DR. MATOS'S OFFICE FOR A HGB A1C & OTHER LABS DONE RECENTLY

## 2020-11-03 LAB
EST. AVERAGE GLUCOSE BLD GHB EST-MCNC: 189 MG/DL
HBA1C MFR BLD: 8.2 % (ref 4–5.6)

## 2020-11-03 NOTE — PERIOP NOTES
ALL ABNORMAL LABS CALLED TO DR. Vignesh Parekh VOICE-MAIL. ALL REULTS FAXED TO THEIR OFFICE. I ALSO INFORMED NURSE THAT SINCE  YJ=840, & HGB A1C = 9.3 A DTC REFFERAL WOULD BE INITIATED BY US.    CHART WAS HANDED TO MAYRA CANTOR/NP PER GUIDELINES.     ALL LABS SENT TO PCP, Conrado Hammond

## 2020-11-03 NOTE — PERIOP NOTES
Hemoglobin A1c-8.2. Dr. Twin Akhtar and Dr. Tata Jesus offices notified of results via fax. Order entered for inpatient referral to [facility].      Rach Jj NP

## 2020-11-06 ENCOUNTER — HOSPITAL ENCOUNTER (OUTPATIENT)
Dept: PREADMISSION TESTING | Age: 55
Discharge: HOME | End: 2020-11-06
Payer: COMMERCIAL

## 2020-11-06 ENCOUNTER — TRANSCRIBE ORDER (OUTPATIENT)
Dept: REGISTRATION | Age: 55
End: 2020-11-06

## 2020-11-06 DIAGNOSIS — Z01.812 PRE-PROCEDURE LAB EXAM: ICD-10-CM

## 2020-11-06 DIAGNOSIS — Z01.812 PRE-PROCEDURE LAB EXAM: Primary | ICD-10-CM

## 2020-11-06 PROCEDURE — 87635 SARS-COV-2 COVID-19 AMP PRB: CPT

## 2020-11-07 LAB — SARS-COV-2, COV2NT: NOT DETECTED

## 2020-11-10 ENCOUNTER — ANESTHESIA EVENT (OUTPATIENT)
Dept: SURGERY | Age: 55
End: 2020-11-10
Payer: COMMERCIAL

## 2020-11-10 ENCOUNTER — HOSPITAL ENCOUNTER (OUTPATIENT)
Age: 55
Setting detail: OBSERVATION
Discharge: HOME | End: 2020-11-11
Attending: OBSTETRICS & GYNECOLOGY | Admitting: OBSTETRICS & GYNECOLOGY
Payer: COMMERCIAL

## 2020-11-10 ENCOUNTER — ANESTHESIA (OUTPATIENT)
Dept: SURGERY | Age: 55
End: 2020-11-10
Payer: COMMERCIAL

## 2020-11-10 DIAGNOSIS — G89.18 POSTOPERATIVE PAIN: Primary | ICD-10-CM

## 2020-11-10 PROBLEM — D25.9 FIBROID UTERUS: Status: ACTIVE | Noted: 2020-11-10

## 2020-11-10 PROBLEM — N92.0 MENORRHAGIA: Status: ACTIVE | Noted: 2020-11-10

## 2020-11-10 LAB
GLUCOSE BLD STRIP.AUTO-MCNC: 176 MG/DL (ref 65–100)
GLUCOSE BLD STRIP.AUTO-MCNC: 248 MG/DL (ref 65–100)
GLUCOSE BLD STRIP.AUTO-MCNC: 288 MG/DL (ref 65–100)
GLUCOSE BLD STRIP.AUTO-MCNC: 290 MG/DL (ref 65–100)
HCG UR QL: NEGATIVE
SERVICE CMNT-IMP: ABNORMAL

## 2020-11-10 PROCEDURE — 99218 HC RM OBSERVATION: CPT

## 2020-11-10 PROCEDURE — 74011000250 HC RX REV CODE- 250: Performed by: NURSE ANESTHETIST, CERTIFIED REGISTERED

## 2020-11-10 PROCEDURE — 77030003578 HC NDL INSUF VERES AMR -B: Performed by: OBSTETRICS & GYNECOLOGY

## 2020-11-10 PROCEDURE — 74011250637 HC RX REV CODE- 250/637: Performed by: OBSTETRICS & GYNECOLOGY

## 2020-11-10 PROCEDURE — 77030008684 HC TU ET CUF COVD -B: Performed by: ANESTHESIOLOGY

## 2020-11-10 PROCEDURE — 74011000250 HC RX REV CODE- 250: Performed by: OBSTETRICS & GYNECOLOGY

## 2020-11-10 PROCEDURE — 74011000636 HC RX REV CODE- 636: Performed by: NURSE ANESTHETIST, CERTIFIED REGISTERED

## 2020-11-10 PROCEDURE — 77030008756 HC TU IRR SUC STRY -B: Performed by: OBSTETRICS & GYNECOLOGY

## 2020-11-10 PROCEDURE — 2709999900 HC NON-CHARGEABLE SUPPLY: Performed by: OBSTETRICS & GYNECOLOGY

## 2020-11-10 PROCEDURE — 77030035236 HC SUT PDS STRATFX BARB J&J -B: Performed by: OBSTETRICS & GYNECOLOGY

## 2020-11-10 PROCEDURE — 77030035277 HC OBTRTR BLDELSS DISP INTU -B: Performed by: OBSTETRICS & GYNECOLOGY

## 2020-11-10 PROCEDURE — 36415 COLL VENOUS BLD VENIPUNCTURE: CPT

## 2020-11-10 PROCEDURE — 77030016151 HC PROTCTR LNS DFOG COVD -B: Performed by: OBSTETRICS & GYNECOLOGY

## 2020-11-10 PROCEDURE — 74011000258 HC RX REV CODE- 258: Performed by: NURSE ANESTHETIST, CERTIFIED REGISTERED

## 2020-11-10 PROCEDURE — 77030008603 HC TRCR ENDOSC EPATH J&J -C: Performed by: OBSTETRICS & GYNECOLOGY

## 2020-11-10 PROCEDURE — 77030026438 HC STYL ET INTUB CARD -A: Performed by: ANESTHESIOLOGY

## 2020-11-10 PROCEDURE — 77030040361 HC SLV COMPR DVT MDII -B: Performed by: OBSTETRICS & GYNECOLOGY

## 2020-11-10 PROCEDURE — 76210000000 HC OR PH I REC 2 TO 2.5 HR: Performed by: OBSTETRICS & GYNECOLOGY

## 2020-11-10 PROCEDURE — 77030002933 HC SUT MCRYL J&J -A: Performed by: OBSTETRICS & GYNECOLOGY

## 2020-11-10 PROCEDURE — 74011636637 HC RX REV CODE- 636/637: Performed by: OBSTETRICS & GYNECOLOGY

## 2020-11-10 PROCEDURE — 74011250636 HC RX REV CODE- 250/636: Performed by: ANESTHESIOLOGY

## 2020-11-10 PROCEDURE — 81025 URINE PREGNANCY TEST: CPT

## 2020-11-10 PROCEDURE — 74011250636 HC RX REV CODE- 250/636: Performed by: NURSE ANESTHETIST, CERTIFIED REGISTERED

## 2020-11-10 PROCEDURE — 74011250636 HC RX REV CODE- 250/636: Performed by: OBSTETRICS & GYNECOLOGY

## 2020-11-10 PROCEDURE — 77030010517 HC APPL SEAL FLOSEL BAXT -B: Performed by: OBSTETRICS & GYNECOLOGY

## 2020-11-10 PROCEDURE — 77030040830 HC CATH URETH FOL MDII -A: Performed by: OBSTETRICS & GYNECOLOGY

## 2020-11-10 PROCEDURE — 77030040922 HC BLNKT HYPOTHRM STRY -A

## 2020-11-10 PROCEDURE — 77030018778 HC MANIP UTER VCAR CNMD -B: Performed by: OBSTETRICS & GYNECOLOGY

## 2020-11-10 PROCEDURE — 76010000879 HC OR TIME 3.5 TO 4HR INTENSV - TIER 2: Performed by: OBSTETRICS & GYNECOLOGY

## 2020-11-10 PROCEDURE — 82962 GLUCOSE BLOOD TEST: CPT

## 2020-11-10 PROCEDURE — 77030013079 HC BLNKT BAIR HGGR 3M -A: Performed by: ANESTHESIOLOGY

## 2020-11-10 PROCEDURE — 77030008557 HC TBNG SMK EVAC STOR -B: Performed by: OBSTETRICS & GYNECOLOGY

## 2020-11-10 PROCEDURE — 77030018832 HC SOL IRR H20 ICUM -A: Performed by: OBSTETRICS & GYNECOLOGY

## 2020-11-10 PROCEDURE — 77030029357 HC DEV CLSR FAC SYS EFX TELE -C: Performed by: OBSTETRICS & GYNECOLOGY

## 2020-11-10 PROCEDURE — 77030019908 HC STETH ESOPH SIMS -A: Performed by: ANESTHESIOLOGY

## 2020-11-10 PROCEDURE — 77030019927 HC TBNG IRR CYSTO BAXT -A: Performed by: OBSTETRICS & GYNECOLOGY

## 2020-11-10 PROCEDURE — 76060000038 HC ANESTHESIA 3.5 TO 4 HR: Performed by: OBSTETRICS & GYNECOLOGY

## 2020-11-10 PROCEDURE — 77030010507 HC ADH SKN DERMBND J&J -B: Performed by: OBSTETRICS & GYNECOLOGY

## 2020-11-10 PROCEDURE — 88307 TISSUE EXAM BY PATHOLOGIST: CPT

## 2020-11-10 RX ORDER — IBUPROFEN 400 MG/1
800 TABLET ORAL
Status: DISCONTINUED | OUTPATIENT
Start: 2020-11-10 | End: 2020-11-11 | Stop reason: HOSPADM

## 2020-11-10 RX ORDER — ATORVASTATIN CALCIUM 20 MG/1
20 TABLET, FILM COATED ORAL
Status: DISCONTINUED | OUTPATIENT
Start: 2020-11-11 | End: 2020-11-11 | Stop reason: HOSPADM

## 2020-11-10 RX ORDER — INSULIN GLARGINE 100 [IU]/ML
20 INJECTION, SOLUTION SUBCUTANEOUS
Status: DISCONTINUED | OUTPATIENT
Start: 2020-11-10 | End: 2020-11-11 | Stop reason: HOSPADM

## 2020-11-10 RX ORDER — GABAPENTIN 300 MG/1
300 CAPSULE ORAL
Status: DISCONTINUED | OUTPATIENT
Start: 2020-11-10 | End: 2020-11-11 | Stop reason: HOSPADM

## 2020-11-10 RX ORDER — SODIUM CHLORIDE 0.9 % (FLUSH) 0.9 %
5-40 SYRINGE (ML) INJECTION AS NEEDED
Status: DISCONTINUED | OUTPATIENT
Start: 2020-11-10 | End: 2020-11-11 | Stop reason: HOSPADM

## 2020-11-10 RX ORDER — METRONIDAZOLE 500 MG/100ML
500 INJECTION, SOLUTION INTRAVENOUS ONCE
Status: COMPLETED | OUTPATIENT
Start: 2020-11-10 | End: 2020-11-10

## 2020-11-10 RX ORDER — FENTANYL CITRATE 50 UG/ML
INJECTION, SOLUTION INTRAMUSCULAR; INTRAVENOUS AS NEEDED
Status: DISCONTINUED | OUTPATIENT
Start: 2020-11-10 | End: 2020-11-10 | Stop reason: HOSPADM

## 2020-11-10 RX ORDER — SODIUM CHLORIDE 0.9 % (FLUSH) 0.9 %
5-40 SYRINGE (ML) INJECTION AS NEEDED
Status: DISCONTINUED | OUTPATIENT
Start: 2020-11-10 | End: 2020-11-10 | Stop reason: HOSPADM

## 2020-11-10 RX ORDER — DEXTROSE 50 % IN WATER (D50W) INTRAVENOUS SYRINGE
25-50 AS NEEDED
Status: DISCONTINUED | OUTPATIENT
Start: 2020-11-10 | End: 2020-11-10 | Stop reason: SDUPTHER

## 2020-11-10 RX ORDER — ONDANSETRON 2 MG/ML
INJECTION INTRAMUSCULAR; INTRAVENOUS AS NEEDED
Status: DISCONTINUED | OUTPATIENT
Start: 2020-11-10 | End: 2020-11-10 | Stop reason: HOSPADM

## 2020-11-10 RX ORDER — METFORMIN HYDROCHLORIDE 500 MG/1
500 TABLET ORAL 2 TIMES DAILY WITH MEALS
Status: DISCONTINUED | OUTPATIENT
Start: 2020-11-10 | End: 2020-11-11 | Stop reason: HOSPADM

## 2020-11-10 RX ORDER — SODIUM CHLORIDE, SODIUM LACTATE, POTASSIUM CHLORIDE, CALCIUM CHLORIDE 600; 310; 30; 20 MG/100ML; MG/100ML; MG/100ML; MG/100ML
INJECTION, SOLUTION INTRAVENOUS
Status: DISCONTINUED | OUTPATIENT
Start: 2020-11-10 | End: 2020-11-10 | Stop reason: HOSPADM

## 2020-11-10 RX ORDER — FENTANYL CITRATE 50 UG/ML
25 INJECTION, SOLUTION INTRAMUSCULAR; INTRAVENOUS
Status: DISCONTINUED | OUTPATIENT
Start: 2020-11-10 | End: 2020-11-10 | Stop reason: HOSPADM

## 2020-11-10 RX ORDER — SODIUM CHLORIDE, SODIUM LACTATE, POTASSIUM CHLORIDE, CALCIUM CHLORIDE 600; 310; 30; 20 MG/100ML; MG/100ML; MG/100ML; MG/100ML
125 INJECTION, SOLUTION INTRAVENOUS CONTINUOUS
Status: DISCONTINUED | OUTPATIENT
Start: 2020-11-10 | End: 2020-11-11 | Stop reason: HOSPADM

## 2020-11-10 RX ORDER — INSULIN LISPRO 100 [IU]/ML
INJECTION, SOLUTION INTRAVENOUS; SUBCUTANEOUS
Status: DISCONTINUED | OUTPATIENT
Start: 2020-11-10 | End: 2020-11-11 | Stop reason: HOSPADM

## 2020-11-10 RX ORDER — ONDANSETRON 2 MG/ML
4 INJECTION INTRAMUSCULAR; INTRAVENOUS
Status: DISCONTINUED | OUTPATIENT
Start: 2020-11-10 | End: 2020-11-11 | Stop reason: HOSPADM

## 2020-11-10 RX ORDER — OXYCODONE HYDROCHLORIDE 5 MG/1
5 TABLET ORAL AS NEEDED
Status: DISCONTINUED | OUTPATIENT
Start: 2020-11-10 | End: 2020-11-10 | Stop reason: HOSPADM

## 2020-11-10 RX ORDER — MIDAZOLAM HYDROCHLORIDE 1 MG/ML
INJECTION, SOLUTION INTRAMUSCULAR; INTRAVENOUS AS NEEDED
Status: DISCONTINUED | OUTPATIENT
Start: 2020-11-10 | End: 2020-11-10 | Stop reason: HOSPADM

## 2020-11-10 RX ORDER — DIPHENHYDRAMINE HYDROCHLORIDE 50 MG/ML
12.5 INJECTION, SOLUTION INTRAMUSCULAR; INTRAVENOUS
Status: DISCONTINUED | OUTPATIENT
Start: 2020-11-10 | End: 2020-11-11 | Stop reason: HOSPADM

## 2020-11-10 RX ORDER — HYDRALAZINE HYDROCHLORIDE 20 MG/ML
INJECTION INTRAMUSCULAR; INTRAVENOUS AS NEEDED
Status: DISCONTINUED | OUTPATIENT
Start: 2020-11-10 | End: 2020-11-10 | Stop reason: HOSPADM

## 2020-11-10 RX ORDER — FENTANYL CITRATE 50 UG/ML
50 INJECTION, SOLUTION INTRAMUSCULAR; INTRAVENOUS AS NEEDED
Status: DISCONTINUED | OUTPATIENT
Start: 2020-11-10 | End: 2020-11-10 | Stop reason: HOSPADM

## 2020-11-10 RX ORDER — METHYLENE BLUE 10 MG/ML
INJECTION INTRAVENOUS AS NEEDED
Status: DISCONTINUED | OUTPATIENT
Start: 2020-11-10 | End: 2020-11-10 | Stop reason: HOSPADM

## 2020-11-10 RX ORDER — KETAMINE HYDROCHLORIDE 10 MG/ML
INJECTION, SOLUTION INTRAMUSCULAR; INTRAVENOUS AS NEEDED
Status: DISCONTINUED | OUTPATIENT
Start: 2020-11-10 | End: 2020-11-10 | Stop reason: HOSPADM

## 2020-11-10 RX ORDER — MAGNESIUM SULFATE 100 %
4 CRYSTALS MISCELLANEOUS AS NEEDED
Status: DISCONTINUED | OUTPATIENT
Start: 2020-11-10 | End: 2020-11-10 | Stop reason: SDUPTHER

## 2020-11-10 RX ORDER — OXYCODONE AND ACETAMINOPHEN 5; 325 MG/1; MG/1
1 TABLET ORAL
Status: DISCONTINUED | OUTPATIENT
Start: 2020-11-10 | End: 2020-11-11 | Stop reason: HOSPADM

## 2020-11-10 RX ORDER — SUCCINYLCHOLINE CHLORIDE 20 MG/ML
INJECTION INTRAMUSCULAR; INTRAVENOUS AS NEEDED
Status: DISCONTINUED | OUTPATIENT
Start: 2020-11-10 | End: 2020-11-10 | Stop reason: HOSPADM

## 2020-11-10 RX ORDER — ONDANSETRON 2 MG/ML
4 INJECTION INTRAMUSCULAR; INTRAVENOUS AS NEEDED
Status: DISCONTINUED | OUTPATIENT
Start: 2020-11-10 | End: 2020-11-10 | Stop reason: HOSPADM

## 2020-11-10 RX ORDER — DOCUSATE SODIUM 100 MG/1
100 CAPSULE, LIQUID FILLED ORAL 2 TIMES DAILY
Status: DISCONTINUED | OUTPATIENT
Start: 2020-11-10 | End: 2020-11-11 | Stop reason: HOSPADM

## 2020-11-10 RX ORDER — HYDROMORPHONE HYDROCHLORIDE 1 MG/ML
0.2 INJECTION, SOLUTION INTRAMUSCULAR; INTRAVENOUS; SUBCUTANEOUS
Status: DISCONTINUED | OUTPATIENT
Start: 2020-11-10 | End: 2020-11-10 | Stop reason: HOSPADM

## 2020-11-10 RX ORDER — SODIUM CHLORIDE, SODIUM LACTATE, POTASSIUM CHLORIDE, CALCIUM CHLORIDE 600; 310; 30; 20 MG/100ML; MG/100ML; MG/100ML; MG/100ML
25 INJECTION, SOLUTION INTRAVENOUS CONTINUOUS
Status: DISCONTINUED | OUTPATIENT
Start: 2020-11-10 | End: 2020-11-10 | Stop reason: HOSPADM

## 2020-11-10 RX ORDER — SODIUM CHLORIDE, SODIUM LACTATE, POTASSIUM CHLORIDE, CALCIUM CHLORIDE 600; 310; 30; 20 MG/100ML; MG/100ML; MG/100ML; MG/100ML
125 INJECTION, SOLUTION INTRAVENOUS CONTINUOUS
Status: DISCONTINUED | OUTPATIENT
Start: 2020-11-10 | End: 2020-11-10 | Stop reason: HOSPADM

## 2020-11-10 RX ORDER — SODIUM CHLORIDE 0.9 % (FLUSH) 0.9 %
5-40 SYRINGE (ML) INJECTION EVERY 8 HOURS
Status: DISCONTINUED | OUTPATIENT
Start: 2020-11-10 | End: 2020-11-10 | Stop reason: HOSPADM

## 2020-11-10 RX ORDER — ENOXAPARIN SODIUM 100 MG/ML
40 INJECTION SUBCUTANEOUS EVERY 24 HOURS
Status: DISCONTINUED | OUTPATIENT
Start: 2020-11-11 | End: 2020-11-11 | Stop reason: HOSPADM

## 2020-11-10 RX ORDER — ACETAMINOPHEN 325 MG/1
650 TABLET ORAL ONCE
Status: DISCONTINUED | OUTPATIENT
Start: 2020-11-10 | End: 2020-11-10 | Stop reason: HOSPADM

## 2020-11-10 RX ORDER — MAGNESIUM SULFATE 100 %
4 CRYSTALS MISCELLANEOUS AS NEEDED
Status: DISCONTINUED | OUTPATIENT
Start: 2020-11-10 | End: 2020-11-11 | Stop reason: HOSPADM

## 2020-11-10 RX ORDER — ATROPINE SULFATE 0.4 MG/ML
INJECTION, SOLUTION ENDOTRACHEAL; INTRAMEDULLARY; INTRAMUSCULAR; INTRAVENOUS; SUBCUTANEOUS AS NEEDED
Status: DISCONTINUED | OUTPATIENT
Start: 2020-11-10 | End: 2020-11-10 | Stop reason: HOSPADM

## 2020-11-10 RX ORDER — BUPIVACAINE HYDROCHLORIDE 5 MG/ML
INJECTION, SOLUTION EPIDURAL; INTRACAUDAL AS NEEDED
Status: DISCONTINUED | OUTPATIENT
Start: 2020-11-10 | End: 2020-11-10 | Stop reason: HOSPADM

## 2020-11-10 RX ORDER — LISINOPRIL 5 MG/1
5 TABLET ORAL DAILY
Status: DISCONTINUED | OUTPATIENT
Start: 2020-11-11 | End: 2020-11-11 | Stop reason: HOSPADM

## 2020-11-10 RX ORDER — LABETALOL HYDROCHLORIDE 5 MG/ML
INJECTION, SOLUTION INTRAVENOUS AS NEEDED
Status: DISCONTINUED | OUTPATIENT
Start: 2020-11-10 | End: 2020-11-10 | Stop reason: HOSPADM

## 2020-11-10 RX ORDER — LIDOCAINE HYDROCHLORIDE 10 MG/ML
0.1 INJECTION, SOLUTION EPIDURAL; INFILTRATION; INTRACAUDAL; PERINEURAL AS NEEDED
Status: DISCONTINUED | OUTPATIENT
Start: 2020-11-10 | End: 2020-11-10 | Stop reason: HOSPADM

## 2020-11-10 RX ORDER — LIDOCAINE HYDROCHLORIDE 20 MG/ML
INJECTION, SOLUTION EPIDURAL; INFILTRATION; INTRACAUDAL; PERINEURAL AS NEEDED
Status: DISCONTINUED | OUTPATIENT
Start: 2020-11-10 | End: 2020-11-10 | Stop reason: HOSPADM

## 2020-11-10 RX ORDER — DEXTROSE 50 % IN WATER (D50W) INTRAVENOUS SYRINGE
12.5-25 AS NEEDED
Status: DISCONTINUED | OUTPATIENT
Start: 2020-11-10 | End: 2020-11-10 | Stop reason: SDUPTHER

## 2020-11-10 RX ORDER — DEXAMETHASONE SODIUM PHOSPHATE 4 MG/ML
INJECTION, SOLUTION INTRA-ARTICULAR; INTRALESIONAL; INTRAMUSCULAR; INTRAVENOUS; SOFT TISSUE AS NEEDED
Status: DISCONTINUED | OUTPATIENT
Start: 2020-11-10 | End: 2020-11-10 | Stop reason: HOSPADM

## 2020-11-10 RX ORDER — MIDAZOLAM HYDROCHLORIDE 1 MG/ML
1 INJECTION, SOLUTION INTRAMUSCULAR; INTRAVENOUS AS NEEDED
Status: DISCONTINUED | OUTPATIENT
Start: 2020-11-10 | End: 2020-11-10 | Stop reason: HOSPADM

## 2020-11-10 RX ORDER — MIDAZOLAM HYDROCHLORIDE 1 MG/ML
0.5 INJECTION, SOLUTION INTRAMUSCULAR; INTRAVENOUS
Status: DISCONTINUED | OUTPATIENT
Start: 2020-11-10 | End: 2020-11-10 | Stop reason: HOSPADM

## 2020-11-10 RX ORDER — CEFAZOLIN SODIUM/WATER 2 G/20 ML
2 SYRINGE (ML) INTRAVENOUS ONCE
Status: COMPLETED | OUTPATIENT
Start: 2020-11-10 | End: 2020-11-10

## 2020-11-10 RX ORDER — KETOROLAC TROMETHAMINE 30 MG/ML
30 INJECTION, SOLUTION INTRAMUSCULAR; INTRAVENOUS
Status: DISCONTINUED | OUTPATIENT
Start: 2020-11-10 | End: 2020-11-10

## 2020-11-10 RX ORDER — SODIUM CHLORIDE 9 MG/ML
25 INJECTION, SOLUTION INTRAVENOUS CONTINUOUS
Status: DISCONTINUED | OUTPATIENT
Start: 2020-11-10 | End: 2020-11-10 | Stop reason: HOSPADM

## 2020-11-10 RX ORDER — MORPHINE SULFATE 2 MG/ML
2 INJECTION, SOLUTION INTRAMUSCULAR; INTRAVENOUS
Status: DISCONTINUED | OUTPATIENT
Start: 2020-11-10 | End: 2020-11-11 | Stop reason: HOSPADM

## 2020-11-10 RX ORDER — ROCURONIUM BROMIDE 10 MG/ML
INJECTION, SOLUTION INTRAVENOUS AS NEEDED
Status: DISCONTINUED | OUTPATIENT
Start: 2020-11-10 | End: 2020-11-10 | Stop reason: HOSPADM

## 2020-11-10 RX ORDER — NALOXONE HYDROCHLORIDE 0.4 MG/ML
0.4 INJECTION, SOLUTION INTRAMUSCULAR; INTRAVENOUS; SUBCUTANEOUS AS NEEDED
Status: DISCONTINUED | OUTPATIENT
Start: 2020-11-10 | End: 2020-11-11 | Stop reason: HOSPADM

## 2020-11-10 RX ORDER — SODIUM CHLORIDE 0.9 % (FLUSH) 0.9 %
5-40 SYRINGE (ML) INJECTION EVERY 8 HOURS
Status: DISCONTINUED | OUTPATIENT
Start: 2020-11-10 | End: 2020-11-11 | Stop reason: HOSPADM

## 2020-11-10 RX ORDER — DIPHENHYDRAMINE HYDROCHLORIDE 50 MG/ML
12.5 INJECTION, SOLUTION INTRAMUSCULAR; INTRAVENOUS AS NEEDED
Status: DISCONTINUED | OUTPATIENT
Start: 2020-11-10 | End: 2020-11-10 | Stop reason: HOSPADM

## 2020-11-10 RX ORDER — HYDROMORPHONE HYDROCHLORIDE 2 MG/ML
INJECTION, SOLUTION INTRAMUSCULAR; INTRAVENOUS; SUBCUTANEOUS AS NEEDED
Status: DISCONTINUED | OUTPATIENT
Start: 2020-11-10 | End: 2020-11-10 | Stop reason: HOSPADM

## 2020-11-10 RX ORDER — PROPOFOL 10 MG/ML
INJECTION, EMULSION INTRAVENOUS AS NEEDED
Status: DISCONTINUED | OUTPATIENT
Start: 2020-11-10 | End: 2020-11-10 | Stop reason: HOSPADM

## 2020-11-10 RX ORDER — MORPHINE SULFATE 10 MG/ML
2 INJECTION, SOLUTION INTRAMUSCULAR; INTRAVENOUS
Status: DISCONTINUED | OUTPATIENT
Start: 2020-11-10 | End: 2020-11-10 | Stop reason: HOSPADM

## 2020-11-10 RX ADMIN — SODIUM CHLORIDE, POTASSIUM CHLORIDE, SODIUM LACTATE AND CALCIUM CHLORIDE: 600; 310; 30; 20 INJECTION, SOLUTION INTRAVENOUS at 07:34

## 2020-11-10 RX ADMIN — HYDRALAZINE HYDROCHLORIDE 2 MG: 20 INJECTION INTRAMUSCULAR; INTRAVENOUS at 08:11

## 2020-11-10 RX ADMIN — HYDRALAZINE HYDROCHLORIDE 5 MG: 20 INJECTION INTRAMUSCULAR; INTRAVENOUS at 08:02

## 2020-11-10 RX ADMIN — FENTANYL CITRATE 25 MCG: 50 INJECTION, SOLUTION INTRAMUSCULAR; INTRAVENOUS at 11:47

## 2020-11-10 RX ADMIN — PROPOFOL 150 MG: 10 INJECTION, EMULSION INTRAVENOUS at 07:29

## 2020-11-10 RX ADMIN — ROCURONIUM BROMIDE 5 MG: 10 SOLUTION INTRAVENOUS at 07:29

## 2020-11-10 RX ADMIN — SODIUM CHLORIDE, POTASSIUM CHLORIDE, SODIUM LACTATE AND CALCIUM CHLORIDE: 600; 310; 30; 20 INJECTION, SOLUTION INTRAVENOUS at 07:25

## 2020-11-10 RX ADMIN — SODIUM CHLORIDE, SODIUM LACTATE, POTASSIUM CHLORIDE, AND CALCIUM CHLORIDE 125 ML/HR: 600; 310; 30; 20 INJECTION, SOLUTION INTRAVENOUS at 20:35

## 2020-11-10 RX ADMIN — KETOROLAC TROMETHAMINE 30 MG: 30 INJECTION, SOLUTION INTRAMUSCULAR at 13:44

## 2020-11-10 RX ADMIN — SODIUM CHLORIDE, SODIUM LACTATE, POTASSIUM CHLORIDE, AND CALCIUM CHLORIDE 25 ML/HR: 600; 310; 30; 20 INJECTION, SOLUTION INTRAVENOUS at 06:51

## 2020-11-10 RX ADMIN — FENTANYL CITRATE 100 MCG: 50 INJECTION, SOLUTION INTRAMUSCULAR; INTRAVENOUS at 07:32

## 2020-11-10 RX ADMIN — OXYCODONE HYDROCHLORIDE AND ACETAMINOPHEN 1 TABLET: 5; 325 TABLET ORAL at 22:55

## 2020-11-10 RX ADMIN — HYDROMORPHONE HYDROCHLORIDE 0.2 MG: 2 INJECTION, SOLUTION INTRAMUSCULAR; INTRAVENOUS; SUBCUTANEOUS at 08:11

## 2020-11-10 RX ADMIN — ROCURONIUM BROMIDE 10 MG: 10 SOLUTION INTRAVENOUS at 09:02

## 2020-11-10 RX ADMIN — METFORMIN HYDROCHLORIDE 500 MG: 500 TABLET ORAL at 17:10

## 2020-11-10 RX ADMIN — SODIUM CHLORIDE, POTASSIUM CHLORIDE, SODIUM LACTATE AND CALCIUM CHLORIDE: 600; 310; 30; 20 INJECTION, SOLUTION INTRAVENOUS at 09:30

## 2020-11-10 RX ADMIN — INSULIN GLARGINE 20 UNITS: 100 INJECTION, SOLUTION SUBCUTANEOUS at 22:27

## 2020-11-10 RX ADMIN — INSULIN LISPRO 5 UNITS: 100 INJECTION, SOLUTION INTRAVENOUS; SUBCUTANEOUS at 17:29

## 2020-11-10 RX ADMIN — ROCURONIUM BROMIDE 35 MG: 10 SOLUTION INTRAVENOUS at 07:32

## 2020-11-10 RX ADMIN — INSULIN LISPRO 2 UNITS: 100 INJECTION, SOLUTION INTRAVENOUS; SUBCUTANEOUS at 22:27

## 2020-11-10 RX ADMIN — Medication 10 ML: at 17:10

## 2020-11-10 RX ADMIN — DEXMEDETOMIDINE HYDROCHLORIDE 4 MCG: 100 INJECTION, SOLUTION, CONCENTRATE INTRAVENOUS at 08:04

## 2020-11-10 RX ADMIN — DEXAMETHASONE SODIUM PHOSPHATE 4 MG: 4 INJECTION, SOLUTION INTRAMUSCULAR; INTRAVENOUS at 07:29

## 2020-11-10 RX ADMIN — KETAMINE HYDROCHLORIDE 50 MG: 10 INJECTION, SOLUTION INTRAMUSCULAR; INTRAVENOUS at 07:29

## 2020-11-10 RX ADMIN — ROCURONIUM BROMIDE 10 MG: 10 SOLUTION INTRAVENOUS at 07:41

## 2020-11-10 RX ADMIN — ROCURONIUM BROMIDE 20 MG: 10 SOLUTION INTRAVENOUS at 08:30

## 2020-11-10 RX ADMIN — PROPOFOL 100 MG: 10 INJECTION, EMULSION INTRAVENOUS at 07:46

## 2020-11-10 RX ADMIN — FENTANYL CITRATE 25 MCG: 50 INJECTION, SOLUTION INTRAMUSCULAR; INTRAVENOUS at 11:42

## 2020-11-10 RX ADMIN — METHYLENE BLUE 10 ML: 10 INJECTION, SOLUTION INTRAVENOUS at 09:55

## 2020-11-10 RX ADMIN — DEXMEDETOMIDINE HYDROCHLORIDE 4 MCG: 100 INJECTION, SOLUTION, CONCENTRATE INTRAVENOUS at 08:02

## 2020-11-10 RX ADMIN — Medication 2 G: at 07:40

## 2020-11-10 RX ADMIN — LIDOCAINE HYDROCHLORIDE 80 MG: 20 INJECTION, SOLUTION EPIDURAL; INFILTRATION; INTRACAUDAL; PERINEURAL at 07:29

## 2020-11-10 RX ADMIN — LABETALOL HYDROCHLORIDE 7.5 MG: 5 INJECTION INTRAVENOUS at 07:32

## 2020-11-10 RX ADMIN — DEXMEDETOMIDINE HYDROCHLORIDE 4 MCG: 100 INJECTION, SOLUTION, CONCENTRATE INTRAVENOUS at 08:03

## 2020-11-10 RX ADMIN — MIDAZOLAM 2 MG: 1 INJECTION INTRAMUSCULAR; INTRAVENOUS at 07:24

## 2020-11-10 RX ADMIN — HYDROMORPHONE HYDROCHLORIDE 0.3 MG: 2 INJECTION, SOLUTION INTRAMUSCULAR; INTRAVENOUS; SUBCUTANEOUS at 09:41

## 2020-11-10 RX ADMIN — PROPOFOL 50 MG: 10 INJECTION, EMULSION INTRAVENOUS at 07:31

## 2020-11-10 RX ADMIN — DOCUSATE SODIUM 100 MG: 100 CAPSULE, LIQUID FILLED ORAL at 17:10

## 2020-11-10 RX ADMIN — METRONIDAZOLE 500 MG: 500 SOLUTION INTRAVENOUS at 07:47

## 2020-11-10 RX ADMIN — Medication 0.25 MG: at 08:09

## 2020-11-10 RX ADMIN — ROCURONIUM BROMIDE 10 MG: 10 SOLUTION INTRAVENOUS at 09:31

## 2020-11-10 RX ADMIN — OXYCODONE HYDROCHLORIDE AND ACETAMINOPHEN 1 TABLET: 5; 325 TABLET ORAL at 18:05

## 2020-11-10 RX ADMIN — SUCCINYLCHOLINE CHLORIDE 200 MG: 20 INJECTION, SOLUTION INTRAMUSCULAR; INTRAVENOUS at 07:29

## 2020-11-10 RX ADMIN — ONDANSETRON HYDROCHLORIDE 4 MG: 2 INJECTION, SOLUTION INTRAMUSCULAR; INTRAVENOUS at 09:45

## 2020-11-10 NOTE — BRIEF OP NOTE
Brief Postoperative Note    Patient: Ronn Carrillo  YOB: 1965  MRN: [id number]    Date of Procedure: 11/10/2020     Pre-Op Diagnosis: MENORRHAGIA, ANEMIA, FIBRIODS    Post-Op Diagnosis: Same as preoperative diagnosis. Procedure(s):  DAVINCI TOTAL LAPAROSCOPIC HYSTERECTOMY, BILATERAL SALPINGO OOPHORECTOMY, cystoscopy    Surgeon(s):  Luis Luque DO    Surgical Assistant: Surg Asst-1: German Valadez    Anesthesia: General     Estimated Blood Loss (mL): 54WZ    Complications: None    Specimens:   ID Type Source Tests Collected by Time Destination   1 : Uterus, Cervix, Bilateral tubes and ovaries Fresh Uterus with Bilateral Fallopian tubes and Ovaries  Luis Luque DO 11/10/2020 0716 Pathology        Implants: Surgicel Powder  Drains: None    Findings: EUA-irregularly shaped, enlarged uterus with no adnexal masses. Intraop-uterus sounded to 8cm. Irregularly shaped enlarged fibroid uterus. Normal fallopian tubes and ovaries bilaterally. Thick adhesions of omentum to anterior abdominal wall in the midline from umbilicus down to bladder. Filmy adhesions of bowel to anterior uterus and right anterior pedunculated fibroid.       Electronically Signed by Zaynab Baca DO on 11/10/2020 at 10:33 AM

## 2020-11-10 NOTE — PROGRESS NOTES
TRANSFER - IN REPORT:    Verbal report received from Avinash Scruggs RN(name) on Dilip Azar  being received from PACU (unit) for routine post - op      Report consisted of patients Situation, Background, Assessment and   Recommendations(SBAR). Information from the following report(s) SBAR, Kardex, Intake/Output, MAR, Accordion and Recent Results was reviewed with the receiving nurse. Opportunity for questions and clarification was provided. Assessment completed upon patients arrival to unit and care assumed.

## 2020-11-10 NOTE — ANESTHESIA PREPROCEDURE EVALUATION
Relevant Problems   No relevant active problems       Anesthetic History   No history of anesthetic complications            Review of Systems / Medical History  Patient summary reviewed, nursing notes reviewed and pertinent labs reviewed    Pulmonary        Sleep apnea           Neuro/Psych   Within defined limits           Cardiovascular    Hypertension              Exercise tolerance: >4 METS     GI/Hepatic/Renal  Within defined limits              Endo/Other    Diabetes    Obesity     Other Findings              Physical Exam    Airway  Mallampati: II  TM Distance: > 6 cm  Neck ROM: normal range of motion   Mouth opening: Normal     Cardiovascular  Regular rate and rhythm,  S1 and S2 normal,  no murmur, click, rub, or gallop             Dental  No notable dental hx       Pulmonary  Breath sounds clear to auscultation               Abdominal  GI exam deferred       Other Findings            Anesthetic Plan    ASA: 2  Anesthesia type: general          Induction: Intravenous  Anesthetic plan and risks discussed with: Patient

## 2020-11-10 NOTE — DIABETES MGMT
Consult noted. Will write full note tomorrow. A1C 8.2%    IF BG >200mg/dl, INITIATE basal insulin @ 0.2u/kg dosing =20uits daily    Takes PTA Metformin only.        Signed By: JOHANNY Hall   [facility]    November 10, 2020

## 2020-11-10 NOTE — PROGRESS NOTES
Gynecology Progress Note    Patient doing well post-op day 0 from Procedure(s):  [address],  BILATERAL SALPINGO OOPHORECTOMY, cystoscopy without significant complaints. Pain in lower abdomen currently. Patient is not passing flatus. Pt denies cp/sob/n/v.     Vitals:  Blood pressure 131/82, pulse 86, temperature 97.6 °F (36.4 °C), resp. rate 16, height 5' 9\" (1.753 m), weight 98.7 kg (217 lb 9 oz), last menstrual period 10/12/2020, SpO2 98 %. Temp (24hrs), Av.8 °F (36 °C), Min:95.4 °F (35.2 °C), Max:98.5 °F (36.9 °C)        Exam:  Patient without distress. Heart regular rate and rhythm   Lungs CTA b/l               Abdomen soft,  Positive bowel sounds, non distended                 Incisions dry and clean without erythema. Lower extremities are negative for swelling, cords, or tenderness. SCDS in place. Lab/Data Review: All lab results for the last 24 hours reviewed. Assessment and Plan:  Patient appears to be having uncomplicated post Procedure(s):  [address] [facility], [address], cystoscopy course. Continue routine post-op care. Hopefully up to chair, possible ambulation by this evening. Clear liquids with advance to general as tolerated. Diabetes-continue metformin and cover if needed with SSI. HTN-normal BP on lisinopril. Mathew catheter out tonight or in am once ambulating to bathroom.

## 2020-11-10 NOTE — PERIOP NOTES
TRANSFER - OUT REPORT:    Verbal report given to REGGIE BROTHERS(name) on Dilip Azar  being transferred to 3 E(unit) for routine post - op       Report consisted of patients Situation, Background, Assessment and   Recommendations(SBAR). Time Pre op antibiotic given:Y  Anesthesia Stop time: Y  Mathew Present on Transfer to floor:Y  Order for Mathew on Chart:Y  Discharge Prescriptions with Chart:N    Information from the following report(s) SBAR was reviewed with the receiving nurse. Opportunity for questions and clarification was provided. Is the patient on 02? YES       L/Min 2       Other     Is the patient on a monitor? NO    Is the nurse transporting with the patient? NO    Surgical Waiting Area notified of patient's transfer from PACU? YES      The following personal items collected during your admission accompanied patient upon transfer:   Dental Appliance: Dental Appliances: None  Vision: Visual Aid: None  Hearing Aid:    Jewelry: Jewelry: None  Clothing: Clothing: (to pacu)  Other Valuables:  Other Valuables: None  Valuables sent to safe:

## 2020-11-10 NOTE — ROUTINE PROCESS
Patient: Mohinder Adhikari MRN: [id number]  SSN: [id number] YOB: 1965  Age: 60 y.o. Sex: female Patient is status post Procedure(s): 
DAVINCI TOTAL LAPAROSCOPIC HYSTERECTOMY, BILATERAL SALPINGECTOMY, POSSIBLE BILATERAL SALPINGO OOPHORECTOMY, POSSIBLE LAPAROTOMY. Surgeon(s) and Role: Shiv Grey, DO - Primary Local/Dose/Irrigation:   
 
             
Peripheral IV 11/10/20 Left Forearm (Active) Peripheral IV Left Forearm (Active) Airway - Endotracheal Tube 11/10/20 Oral (Active) Dressing/Packing:  Wound Abdomen-Dressing/Treatment: Surgical glue (11/10/20 0900) Splint/Cast:  ] Other:

## 2020-11-11 VITALS
WEIGHT: 217.56 LBS | DIASTOLIC BLOOD PRESSURE: 73 MMHG | HEIGHT: 69 IN | BODY MASS INDEX: 32.22 KG/M2 | SYSTOLIC BLOOD PRESSURE: 122 MMHG | TEMPERATURE: 97.7 F | RESPIRATION RATE: 17 BRPM | OXYGEN SATURATION: 97 % | HEART RATE: 95 BPM

## 2020-11-11 LAB
ANION GAP SERPL CALC-SCNC: 6 MMOL/L (ref 5–15)
BUN SERPL-MCNC: 14 MG/DL (ref 6–20)
BUN/CREAT SERPL: 15 (ref 12–20)
CALCIUM SERPL-MCNC: 8.7 MG/DL (ref 8.5–10.1)
CHLORIDE SERPL-SCNC: 109 MMOL/L (ref 97–108)
CO2 SERPL-SCNC: 25 MMOL/L (ref 21–32)
CREAT SERPL-MCNC: 0.94 MG/DL (ref 0.55–1.02)
ERYTHROCYTE [DISTWIDTH] IN BLOOD BY AUTOMATED COUNT: 15.9 % (ref 11.5–14.5)
GLUCOSE BLD STRIP.AUTO-MCNC: 136 MG/DL (ref 65–100)
GLUCOSE BLD STRIP.AUTO-MCNC: 178 MG/DL (ref 65–100)
GLUCOSE SERPL-MCNC: 140 MG/DL (ref 65–100)
HCT VFR BLD AUTO: 32.6 % (ref 35–47)
HGB BLD-MCNC: 9.9 G/DL (ref 11.5–16)
MCH RBC QN AUTO: 23.7 PG (ref 26–34)
MCHC RBC AUTO-ENTMCNC: 30.4 G/DL (ref 30–36.5)
MCV RBC AUTO: 78.2 FL (ref 80–99)
NRBC # BLD: 0 K/UL (ref 0–0.01)
NRBC BLD-RTO: 0 PER 100 WBC
PLATELET # BLD AUTO: 305 K/UL (ref 150–400)
PMV BLD AUTO: 11.6 FL (ref 8.9–12.9)
POTASSIUM SERPL-SCNC: 4 MMOL/L (ref 3.5–5.1)
RBC # BLD AUTO: 4.17 M/UL (ref 3.8–5.2)
SERVICE CMNT-IMP: ABNORMAL
SERVICE CMNT-IMP: ABNORMAL
SODIUM SERPL-SCNC: 140 MMOL/L (ref 136–145)
WBC # BLD AUTO: 10.6 K/UL (ref 3.6–11)

## 2020-11-11 PROCEDURE — 82962 GLUCOSE BLOOD TEST: CPT

## 2020-11-11 PROCEDURE — 74011250636 HC RX REV CODE- 250/636: Performed by: OBSTETRICS & GYNECOLOGY

## 2020-11-11 PROCEDURE — 36415 COLL VENOUS BLD VENIPUNCTURE: CPT

## 2020-11-11 PROCEDURE — 85027 COMPLETE CBC AUTOMATED: CPT

## 2020-11-11 PROCEDURE — 99218 HC RM OBSERVATION: CPT

## 2020-11-11 PROCEDURE — 74011636637 HC RX REV CODE- 636/637: Performed by: OBSTETRICS & GYNECOLOGY

## 2020-11-11 PROCEDURE — 80048 BASIC METABOLIC PNL TOTAL CA: CPT

## 2020-11-11 PROCEDURE — 74011250637 HC RX REV CODE- 250/637: Performed by: OBSTETRICS & GYNECOLOGY

## 2020-11-11 RX ORDER — ENOXAPARIN SODIUM 100 MG/ML
40 INJECTION SUBCUTANEOUS EVERY 24 HOURS
Qty: 7 SYRINGE | Refills: 0 | Status: SHIPPED | OUTPATIENT
Start: 2020-11-11 | End: 2020-11-11 | Stop reason: SDUPTHER

## 2020-11-11 RX ORDER — OXYCODONE AND ACETAMINOPHEN 5; 325 MG/1; MG/1
1 TABLET ORAL
Qty: 8 TAB | Refills: 0 | Status: SHIPPED | OUTPATIENT
Start: 2020-11-11 | End: 2020-11-11 | Stop reason: SDUPTHER

## 2020-11-11 RX ORDER — INSULIN GLARGINE 100 [IU]/ML
INJECTION, SOLUTION SUBCUTANEOUS
Qty: 1 VIAL | Refills: 3 | Status: SHIPPED | OUTPATIENT
Start: 2020-11-11

## 2020-11-11 RX ORDER — INSULIN LISPRO 100 [IU]/ML
INJECTION, SOLUTION INTRAVENOUS; SUBCUTANEOUS
Qty: 1 VIAL | Refills: 3 | Status: SHIPPED | OUTPATIENT
Start: 2020-11-11 | End: 2020-11-11 | Stop reason: SDUPTHER

## 2020-11-11 RX ORDER — IBUPROFEN 800 MG/1
800 TABLET ORAL
Qty: 30 TAB | Refills: 0 | Status: SHIPPED | OUTPATIENT
Start: 2020-11-11 | End: 2020-11-11 | Stop reason: SDUPTHER

## 2020-11-11 RX ORDER — OXYCODONE AND ACETAMINOPHEN 5; 325 MG/1; MG/1
1 TABLET ORAL
Qty: 8 TAB | Refills: 0 | Status: SHIPPED | OUTPATIENT
Start: 2020-11-11 | End: 2020-11-14

## 2020-11-11 RX ORDER — INSULIN LISPRO 100 [IU]/ML
INJECTION, SOLUTION INTRAVENOUS; SUBCUTANEOUS
Qty: 1 VIAL | Refills: 3 | Status: SHIPPED | OUTPATIENT
Start: 2020-11-11

## 2020-11-11 RX ORDER — ENOXAPARIN SODIUM 100 MG/ML
40 INJECTION SUBCUTANEOUS EVERY 24 HOURS
Qty: 7 SYRINGE | Refills: 0 | Status: SHIPPED | OUTPATIENT
Start: 2020-11-11 | End: 2020-11-18

## 2020-11-11 RX ORDER — INSULIN GLARGINE 100 [IU]/ML
INJECTION, SOLUTION SUBCUTANEOUS
Qty: 1 VIAL | Refills: 3 | Status: SHIPPED | OUTPATIENT
Start: 2020-11-11 | End: 2020-11-11 | Stop reason: SDUPTHER

## 2020-11-11 RX ORDER — IBUPROFEN 800 MG/1
800 TABLET ORAL
Qty: 30 TAB | Refills: 0 | Status: SHIPPED | OUTPATIENT
Start: 2020-11-11

## 2020-11-11 RX ADMIN — INSULIN LISPRO 2 UNITS: 100 INJECTION, SOLUTION INTRAVENOUS; SUBCUTANEOUS at 06:51

## 2020-11-11 RX ADMIN — GABAPENTIN 300 MG: 300 CAPSULE ORAL at 04:35

## 2020-11-11 RX ADMIN — ATORVASTATIN CALCIUM 20 MG: 20 TABLET, FILM COATED ORAL at 08:40

## 2020-11-11 RX ADMIN — DOCUSATE SODIUM 100 MG: 100 CAPSULE, LIQUID FILLED ORAL at 08:40

## 2020-11-11 RX ADMIN — METFORMIN HYDROCHLORIDE 500 MG: 500 TABLET ORAL at 08:40

## 2020-11-11 RX ADMIN — IBUPROFEN 800 MG: 400 TABLET, FILM COATED ORAL at 04:18

## 2020-11-11 RX ADMIN — ENOXAPARIN SODIUM 40 MG: 40 INJECTION SUBCUTANEOUS at 08:41

## 2020-11-11 RX ADMIN — LISINOPRIL 5 MG: 5 TABLET ORAL at 08:40

## 2020-11-11 NOTE — PROGRESS NOTES
Bedside and Verbal shift change report given to GOLDIE Martinez RN (oncoming nurse) by SEN Beltran RN (offgoing nurse). Report included the following information SBAR, Kardex, Intake/Output, MAR and Accordion.

## 2020-11-11 NOTE — PROGRESS NOTES
Gynecology Progress Note    Patient doing well post-op day 0 from Procedure(s):  [address], cystoscopy without significant complaints. Pain controlled on current medication. Voiding without difficulty. Patient is not passing flatus. Denies cp/sob/n/v. Ambulated this evening in the halls. Tolerating diabetic diet. Vitals:  Blood pressure 127/71, pulse 81, temperature 97.6 °F (36.4 °C), resp. rate 17, height 5' 9\" (1.753 m), weight 98.7 kg (217 lb 9 oz), last menstrual period 10/12/2020, SpO2 98 %. Temp (24hrs), Av °F (36.1 °C), Min:95.4 °F (35.2 °C), Max:98.5 °F (36.9 °C)        Exam:  Patient without distress. Heart regular rate and rhythm   Lungs CTA B/l               Abdomen soft,  Nontender, positive bowel sounds. Incisions dry and clean without erythema. Lower extremities are negative for swelling, cords, or tenderness. Lab/Data Review:  Recent Glucose Results: >200  Assessment and Plan:  Patient appears to be having uncomplicated post Procedure(s): POD#0 s/p  DAVINCI TOTAL LAPAROSCOPIC HYSTERECTOMY,  BILATERAL SALPINGO OOPHORECTOMY, cystoscopy course. Continue routine post-op care. Start Lantus 20 units qhs per DTC recs. Discussed with pt importance of good glucose control for postop healing. Pt has ability to check bg at home and is able to continue insulin at home. Plan for burt out in am. Continue ambulation. Switch to oral pain meds.

## 2020-11-11 NOTE — OP NOTES
[address]   OPERATIVE REPORT    Name:  Bharath Che  MR#:  [id number]  :  1965  ACCOUNT #:  [de-identified]  DATE OF SERVICE:  11/10/2020      PREOPERATIVE DIAGNOSES:  1. Menorrhagia. 2.  Fibroid uterus. 3.  Anemia. POSTOPERATIVE DIAGNOSES:  1. Menorrhagia. 2.  Fibroid uterus. 3.  Anemia. PROCEDURES PERFORMED:  [place] total laparoscopic hysterectomy, bilateral salpingo-oophorectomy, and cystoscopy. SURGEON:  Mike Chung DO    ASSISTANT:  Shar Beck, surgical assist    ANESTHESIA:  General endotracheal.    COMPLICATIONS:  None. SPECIMENS REMOVED:  Cervix, uterus, bilateral fallopian tubes and ovaries. IMPLANTS:  Surgicel powder. ESTIMATED BLOOD LOSS:  50 mL. IV FLUIDS:  1500 mL of lactated Ringer's. URINE OUTPUT:  Please see nursing documentation    FINDINGS:  Exam under anesthesia revealed an irregularly shaped, enlarged uterus with no adnexal masses. Intraoperatively, the uterus sounded to 8 cm. The uterus was irregularly shaped and enlarged with multiple fibroids. The fallopian tubes and ovaries were normal bilaterally. There were thick adhesions of the omentum to the anterior abdominal wall running in the midline from the umbilicus down to the lower abdomen. Filmy adhesions were noted of the bowel to the anterior uterus and the right anterior pedunculated fibroid. PROCEDURE:  The patient was consented including risks and benefits of the procedure and agreed to proceed. She was taken to the operating room where anesthesia was found to be adequate. She was placed in the dorsal lithotomy position and prepped and draped in the usual sterile fashion. An exam under anesthesia was performed to reveal the above findings. A speculum was placed in the vagina to visualize the cervix and the anterior lip of the cervix was grasped with a single-tooth tenaculum. The uterus was sounded to 8 cm.   A medium-sized [employer] uterine manipulator was passed through the cervical canal and advances to the uterine fundus. The intracavitary balloon was insufflated and the speculum and single-tooth tenaculum were removed. The cervical cup was passed through the vaginal canal and advanced to fit snugly around the cervix. The vaginal cup was then passed through the vaginal canal and advanced to attach snugly against the cervical cup. The Mathew catheter was then placed in the bladder to drain to gravity. Attention was then turned to the abdomen where 0.5% Marcaine plain was injected in the infraumbilical fold. A small stab wound was made with a knife and the Veress needle was passed through this incision to gain access to the peritoneal cavity. A pneumoperitoneum of CO2 gas was obtained after a saline water drop test was used to confirm proper placement. 0.5% Marcaine plain was then injected about 5 cm above the umbilicus in the midline and an 8-mm incision was made at the site. An 8-mm robotic trocar and port was passed through this incision to gain access to the peritoneal cavity. This was done under direct visualization with the laparoscope. The laparoscope and trocar were removed and the laparoscope was passed through the port. The patient was placed in Trendelenburg. The above findings were noted. 0.5% Marcaine plain was injected along the anterior axillary line at the level of the umbilicus on the right, and in the right upper quadrant senior care between this site and the midline laparoscopic port, another 0.5% Marcaine plain was injected here. At these 2 sites, 8-mm incisions were made with a knife and an 8-mm robotic port was passed through the far lateral right incision under direct visualization with laparoscope. In a similar fashion, the 8-mm accessory port was passed through the right upper quadrant incision under direct visualization with the laparoscope. The trocars were removed and the ports were properly seated.   The AirSeal on the accessory port was turned on. With the laparoscope in the right robotic port, the left side of the pelvis and abdomen was able to be visualized on either side of the omental adhesions. 0.5% Marcaine plain was injected in the anterior axillary line at the level of the umbilicus and at this site, 8-mm incision was made with a knife. An 8-mm robotic port was passed through this incision under direct visualization with the laparoscope to gain access to the peritoneal cavity. The trocar was removed and the port was properly seated. The omental adhesions were taken down partially under straight stick laparoscopy under direct visualization with the laparoscope. Bleeding was made hemostatic with monopolar cautery. The robot was then docked on the patient's right side and a fenestrated bipolar was placed in the left robotic port and the monopolar scissors in the right robotic port. At this time, the remainder of the adhesions were taken down in a careful fashion. Hemostasis was confirmed. Adhesions of the bowel over the anterior uterine wall and the right anterior pedunculated fibroid were carefully taken down in a blunt and sharp fashion. Hemostasis was confirmed. The left round ligament was then cauterized and cut. Good hemostasis was confirmed. The left infundibulopelvic ligament was identified, cauterized and cut with good hemostasis confirmed. The right round ligament was then  cauterized and cut with good hemostasis confirmed. This was after some additional adhesions were taken down in a blunt fashion. The anterior leaf of the broad ligament was incised along the reflection from each side to meet in the midline. There was a lot of scar tissue at this site and therefore the bladder was back-filled to better visualize the planes. With the bladder dissected well below the vaginal cup, the right infundibulopelvic ligament was identified, cauterized, and cut with good hemostasis confirmed.   The right ureter was visualized and noted to be peristalsing well below the surgical site. The posterior leaf of the broad ligament was incised on each side and the uterine arteries were skeletonized. The uterine arteries on each side were cauterized and cut using the fenestrated bipolar and monopolar scissors. Good hemostasis was confirmed. These pedicles were released laterally below the level of the cervical cup, knowing that the uterus was too large to deliver in whole. The uterus was then bivalved with the monopolar scissors. The anterior colpotomy was then performed and this incision was carried circumferentially to release the cervix from the vagina. The [facility] uterine manipulator was removed and an Allis was passed vaginally into the pelvic cavity to grasp the specimens which were removed without difficulty. The pelvis was then copiously suction irrigated and good hemostasis was confirmed. Two 2-0 Stratafix sutures were used to close the cuff in a running fashion from each apex overlapping in the midline. The sutures were removed from the abdomen, again suction irrigation was performed and good hemostasis was confirmed. Surgicel powder was placed over the vaginal cuff and pedicles. The omentum that had been taken down was inspected and noted to be hemostatic. Methylene blue was given to the patient prior to vaginal cuff closure. At this time, the robot was undocked and the Mathew catheter was removed. The remaining specimen in the vagina was removed as well and passed off to Pathology. A cystoscopy was performed and strong jets of urine were noted from each ureteral orifice. The cystoscope was removed and a new Mathew catheter was placed in the bladder to drain to gravity. The ports were then removed under direct visualization with the laparoscope. The incisions were closed at the skin level with 4-0 Monocryl in a running subcuticular fashion. Dermabond was placed over these incisions.   The patient was awakened from anesthesia and taken to the recovery room in stable condition. All sponge, lap, needle, and instrument counts were correct.         DO PING [id number]  D:  11/10/2020 11:27  T:  11/10/2020 20:53  JOB #:  [id number]

## 2020-11-11 NOTE — DISCHARGE SUMMARY
Discharge Summary     Name: Radha Prado MRN: [id number]  SSN: [id number]    YOB: 1965  Age: 60 y.o. Sex: female      Allergies: Patient has no known allergies. Admit Date: 11/10/2020    Discharge Date: 11/11/2020      Admitting Physician: Joanna Ballard DO     * Admission Diagnoses: Anemia, Fibroids and Menorrhagia    * Discharge Diagnoses:   Hospital Problems as of 11/11/2020 Date Reviewed: 11/10/2020          Codes Class Noted - Resolved POA    Menorrhagia ICD-10-CM: N92.0  ICD-9-CM: 626.2  11/10/2020 - Present Unknown        Fibroid uterus ICD-10-CM: D25.9  ICD-9-CM: 218.9  11/10/2020 - Present Unknown               * Procedures: DaVinci total laparoscopic hysterectomy, bilateral salpingo-oophorectomy, cystoscopy    * Discharge Condition: [facility] Course: Normal hospital course for this procedure.     Significant Diagnostic Studies:   Recent Results (from the past 24 hour(s))   GLUCOSE, POC    Collection Time: 11/10/20 11:21 AM   Result Value Ref Range    Glucose (POC) 288 (H) 65 - 100 mg/dL    Performed by Alex Rod, POC    Collection Time: 11/10/20  5:19 PM   Result Value Ref Range    Glucose (POC) 290 (H) 65 - 100 mg/dL    Performed by Jered Villareal    GLUCOSE, POC    Collection Time: 11/10/20 10:11 PM   Result Value Ref Range    Glucose (POC) 248 (H) 65 - 100 mg/dL    Performed by [address] [place] [address], BASIC    Collection Time: 11/11/20  4:22 AM   Result Value Ref Range    Sodium 140 136 - 145 mmol/L    Potassium 4.0 3.5 - 5.1 mmol/L    Chloride 109 (H) 97 - 108 mmol/L    CO2 25 21 - 32 mmol/L    Anion gap 6 5 - 15 mmol/L    Glucose 140 (H) 65 - 100 mg/dL    BUN 14 6 - 20 MG/DL    Creatinine 0.94 0.55 - 1.02 MG/DL    BUN/Creatinine ratio 15 12 - 20      GFR est AA >60 >60 ml/min/1.73m2    GFR est non-AA >60 >60 ml/min/1.73m2    Calcium 8.7 8.5 - 10.1 MG/DL   CBC W/O DIFF    Collection Time: 11/11/20  4:22 AM   Result Value Ref Range    WBC 10.6 3.6 - 11.0 K/uL    RBC 4.17 3.80 - 5.20 M/uL    HGB 9.9 (L) 11.5 - 16.0 g/dL    HCT 32.6 (L) 35.0 - 47.0 %    MCV 78.2 (L) 80.0 - 99.0 FL    MCH 23.7 (L) 26.0 - 34.0 PG    MCHC 30.4 30.0 - 36.5 g/dL    RDW 15.9 (H) 11.5 - 14.5 %    PLATELET 974 862 - 888 K/uL    MPV 11.6 8.9 - 12.9 FL    NRBC 0.0 0  WBC    ABSOLUTE NRBC 0.00 0.00 - 0.01 K/uL   GLUCOSE, POC    Collection Time: 11/11/20  6:34 AM   Result Value Ref Range    Glucose (POC) 178 (H) 65 - 100 mg/dL    Performed by Zaki [place]        * Disposition: Home    Discharge Medications:   Current Discharge Medication List      START taking these medications    Details   enoxaparin (LOVENOX) 40 mg/0.4 mL 0.4 mL by SubCUTAneous route every twenty-four (24) hours for 7 days. Qty: 7 Syringe, Refills: 0      ibuprofen (MOTRIN) 800 mg tablet Take 1 Tab by mouth every eight (8) hours as needed for Pain. Qty: 30 Tab, Refills: 0      insulin glargine (LANTUS) 100 unit/mL injection Inject 20 units subcutaneously each evening at bedtime  Qty: 1 Vial, Refills: 3      insulin lispro (HUMALOG) 100 unit/mL injection Follow sliding scale for dosing with four times daily with each meal and at bedtime  Qty: 1 Vial, Refills: 3      oxyCODONE-acetaminophen (PERCOCET) 5-325 mg per tablet Take 1 Tab by mouth every six (6) hours as needed for Pain for up to 3 days. Max Daily Amount: 4 Tabs. Qty: 8 Tab, Refills: 0    Associated Diagnoses: Postoperative pain         CONTINUE these medications which have NOT CHANGED    Details   lisinopriL (PRINIVIL, ZESTRIL) 5 mg tablet Take  by mouth daily. atorvastatin (LIPITOR) 20 mg tablet Take 20 mg by mouth daily (after breakfast). gabapentin (NEURONTIN) 300 mg capsule Take 300 mg by mouth nightly as needed for Pain. \"I TAKE WHEN I HAVE NEUROPATHY\"      ferrous sulfate (Iron) 325 mg (65 mg iron) tablet Take  by mouth. TAKES \" ATLEAST 3 TIMES/WEEK      multivitamin (ONE A DAY) tablet Take 1 Tab by mouth daily.       metFORMIN (GLUCOPHAGE) 500 mg tablet Take 500 mg by mouth two (2) times daily (with meals). 2 TABS IN CD=1416XI & 2 TABS AIN PM=1000MG              * Follow-up Care/Patient Instructions: Activity: No sex, douching, or tampons for 6 weeks or as directed by your physician. No heavy lifting for 6 weeks. No driving while taking pain medication.   Diet: Resume pre-hospital diet  Wound Care: Keep wound clean and dry    Follow-up Information     Follow up With Specialties Details Why Contact Info    Yao Sandoval, [address] [facility] [place] [facility], Gynecology, Obstetrics In 2 weeks  [address] [place] [id number] [phone]

## 2020-11-11 NOTE — PROGRESS NOTES
Bedside and Verbal shift change report given to Rachele Pemberton RN  (oncoming nurse) by Stephanie Sahni RN  (offgoing nurse). Report included the following information SBAR, Kardex, OR Summary, Intake/Output, MAR and Recent Results. 8965. Lovenox teaching provided and education packet given to patient. Pt watched Lovenox teaching video. Pt voiced understanding and demonstration of proper administration. 1126- I have reviewed discharge instructions with the patient. The patient verbalized understanding. Pt given copy of discharge instructions. Denies any questions at this time.

## 2020-11-11 NOTE — PROGRESS NOTES
Gynecology Progress Note    Patient doing well post-op day 1 from Procedure(s):  [address], BILATERAL SALPINGO OOPHORECTOMY, cystoscopy without significant complaints. Pain controlled on current medication. Voiding without difficulty. Patient is not passing flatus. Denies cp/sob/n/v. Ambulating without problem. Vitals:  Blood pressure 126/70, pulse 82, temperature 98 °F (36.7 °C), resp. rate 18, height 5' 9\" (1.753 m), weight 98.7 kg (217 lb 9 oz), last menstrual period 10/12/2020, SpO2 96 %. Temp (24hrs), Av.2 °F (36.2 °C), Min:95.4 °F (35.2 °C), Max:98.3 °F (36.8 °C)        Exam:  Patient without distress. Heart regular rate and rhythm   Lungs CTA b/l               Abdomen soft,  Nontender, positive bowel sounds, non-distended. Incisions dry and clean without erythema. Lower extremities are negative for swelling, cords, or tenderness. Lab/Data Review:  BMP:   Lab Results   Component Value Date/Time     2020 04:22 AM    K 4.0 2020 04:22 AM     (H) 2020 04:22 AM    CO2 25 2020 04:22 AM    AGAP 6 2020 04:22 AM     (H) 2020 04:22 AM    BUN 14 2020 04:22 AM    CREA 0.94 2020 04:22 AM    GFRAA >60 2020 04:22 AM    GFRNA >60 2020 04:22 AM     CBC:   Lab Results   Component Value Date/Time    WBC 10.6 2020 04:22 AM    HGB 9.9 (L) 2020 04:22 AM    HCT 32.6 (L) 2020 04:22 AM     2020 04:22 AM     Recent Glucose Results:   Lab Results   Component Value Date/Time     (H) 2020 04:22 AM       Assessment and Plan:  Patient appears to be having uncomplicated post Procedure(s): POD#1 s/p  DAVINCI TOTAL LAPAROSCOPIC HYSTERECTOMY, BILATERAL SALPINGO OOPHORECTOMY, cystoscopy course. Continue routine post-op care. DM-BG improved s/p lantus 20 units last night. Continue SSI and metformin as well.  Will go home with this new regimen (unless DTC has other recs prior to discharge)  HTN-controlled on lisinopril. Hemodynamically stable. Adequate urine output. Pain controlled. Discharge home with follow up in 2 weeks or sooner prn. Pt will continue lovenox x 1 week postop and will follow up with PCP or endocrinology for further DM management.

## 2020-11-11 NOTE — DISCHARGE INSTRUCTIONS
PATIENT DISCHARGE INSTRUCTIONS      PATIENT DISCHARGE INSTRUCTIONS    Yung Holliday / [id number] : 1965    Admitted 11/10/2020 Discharged: 2020       · It is important that you take the medication exactly as they are prescribed. · Keep your medication in the bottles provided by the pharmacist and keep a list of the medication names, dosages, and times to be taken in your wallet. · Do not take other medications without consulting your doctor. What to do at Home    Recommended Diet: Diabetic Diet    Recommended Activity: no sex, douching, tampons, bath/pool x 8 weeks, no heavy lifting >10 pounds for 8 weeks. No driving x 2 weeks and until off narcotics     If you experience any of the following symptoms excessive pain, nausea/vomiting, bleeding or temp >100.6, please follow up with Dr Kenya Martínez. Diabetes Management:  1. Take a blood glucose reading four times daily:  First thing in the morning before you eat or drink anything. Then before lunch, dinner and bedtime. Make a log and bring to your next doctor appointment. If your blood sugar is over 200 consistently OR   If your blood sugar is under 100 consistently: call your doctor for a medication adjustment      2. Take your insulin once a day- before bedtime. Goal A1C is 7%. 3. Make a follow up appointment with your endocrinologist or primary care physician. Please see her within the next 1-2 weeks. 4. Make sure to get a DILATED eye exam every year. This checks for retinal blood vessel damage caused by long term high blood sugar. 5. Make sure to examine your feet every day looking for any wound or foot irritation as sensation can be impaired in your feet. Always wear socks and/or slippers even while at home. This will protect your feet from injury.       6. Diabetes Self Management Training:  Outpatient appointments are available with a certified diabetic educator who can  you with meal planning, insulin administration and other diabetes management strategies. Please call [phone] to schedule at a location close to your home.

## 2020-11-11 NOTE — PROGRESS NOTES
T.O.C:   Pt expected to d/c to home   Family to provide transport at d/c   Emergency Contact: Krupa Polanco, spouse, [phone]    CM met with pt at bedside; verified demographics, insurance, PCP and emergency contact. Pt expected to d/c to home with family to provide transport. Prior to admission pt ambulates independently; has adequate support. Pt does not have AMD, does not want to complete today; CM provided forms for pt to fill out later. CM explained decision maker as next of kin, pt indicated she understood. [facility] information given to pt for future needs. No other issues at present. CM will continue to follow. Jennifer Moore RN    Care Management Interventions  PCP Verified by CM: Yes(10 months ago)  Palliative Care Criteria Met (RRAT>21 & CHF Dx)?: No  MyChart Signup: No  Discharge Durable Medical Equipment: No  Physical Therapy Consult: No  Occupational Therapy Consult: No  Speech Therapy Consult: No  Current Support Network: Lives with Spouse(10 & 6 yr old children in home)  Confirm Follow Up Transport: Family  The Plan for Transition of Care is Related to the Following Treatment Goals : medically stable  The Patient and/or Patient Representative was Provided with a Choice of Provider and Agrees with the Discharge Plan?: No(n/a)  Freedom of Choice List was Provided with Basic Dialogue that Supports the Patient's Individualized Plan of Care/Goals, Treatment Preferences and Shares the Quality Data Associated with the Providers?: No(n/a)  Discharge Location  Discharge Placement: Home with family assistance    Jennifer Moore RN    Observation notice provided in writing to patient and/or caregiver as well as verbal explanation of the policy. Patients who are in outpatient status also receive the Observation notice.       Jennifer Moore RN

## 2020-11-11 NOTE — CONSULTS
[facility]  CLINICAL NURSE SPECIALIST CONSULT  PROGRAM FOR DIABETES HEALTH    INITIAL NOTE    Presentation   Charlette Henderson is a 60 y.o. female who was admitted for a scheduled for a total laproscopic hysterectomy for menorrhagia and fibroids. HX: Sleep apnea, HTN, Diabetes, obesity    DX: Menorrhagia, anemia and uterine fibroids    TX:  Total laproscopic hysterectomy with bilateral salpingo oophorectomy with cystoscopy. Current clinical course has been complicated by steroid induced hyperglycemia. Diabetes: Patient has known Type two diabetes, treated with metformin PTA. Family history positive for diabetes. Consulted by Provider for advanced diabetes nursing assessment and care, specifically related to   [x] Inpatient management strategy  [x] Home management assessment      Diabetes-related medical history  Acute complications: Steroid induced hyperglycemia  Neurological complications: peripheral neuropathy  Microvascular disease: None  Macrovascular disease: None      Diabetes medication history  Drug class Currently in use Discontinued Never used   Biguanide Metformin  1000 mg BID     DDP-4 inhibitor       Sulfonylurea      Thiazolidinedione      GLP-1 RA  Trulicity once weekly    SGLT-2 inhibitors      Basal insulin      Fixed Dose  Combinations      Bolus insulin        Subjective   I don't know why I haven't taken my diabetes more seriously. My A1C hasn't been this high. I need to move forward with changing things.     PCP Dr Blunt Staff  A1C 8.2%  Was diagnosed with Diabetes in her late 35s while she was having infertility concerns- was on hormone injections and glucose was noted to be elevated. Works full time for [employer]- working from home. Plans to retire in December.     Patient reports the following home diabetes self-care practices:  Eating pattern  [x] Breakfast Oatmeal (sugar, butter, milk) or eggs and hartman  [x] Lunch  [place] ([facility]) or ham sandwich  [x] [employer], or shrimp or BBQ chicken  [x] Bedtime None  [x] Snacks Chips or cookies  [x] Beverages Gingerale or Water  Physical activity pattern  [x] Aerobic exercise Enjoys walking with family and friends in the neighborhood  Monitoring pattern  [x] Does not monitor  Taking medications pattern  [x] Consistent administration  [x] Affordable    Social determinants of health impacting diabetes self-management practices   Concerned that you need to know more about how to stay healthy with diabetes    Objective   Physical exam  General Alert, oriented and in no acute distress/ill-appearing. Conversant and cooperative. Vital Signs   Visit Vitals  BP (P) 126/60   Pulse 82   Temp 98 °F (36.7 °C)   Resp 18   Ht 5' 9\" (1.753 m)   Wt 98.7 kg (217 lb 9 oz)   SpO2 96%   BMI 32.13 kg/m²     Skin  Warm and dry. No acanthosis noted along neckline. Heart   Regular rate and rhythm. No murmurs, rubs or gallops  Lungs  Clear to auscultation without rales or rhonchi  Extremities No foot wounds    Diabetic foot exam:    Left Foot     Visual Exam: normal    Pulse DP: 2+ (normal)   Filament test: reduced sensation    Vibratory sensation: diminished  Right Foot   Visual Exam: normal    Pulse DP: 2+ (normal)   Filament test: reduced sensation    Vibratory sensation: diminished DP & PT pulses +2.      Laboratory  Lab Results   Component Value Date/Time    Hemoglobin A1c 8.2 (H) 11/02/2020 09:27 AM     No results found for: LDL, LDLC, DLDLP  Lab Results   Component Value Date/Time    Creatinine 0.94 11/11/2020 04:22 AM     Lab Results   Component Value Date/Time    Sodium 140 11/11/2020 04:22 AM    Potassium 4.0 11/11/2020 04:22 AM    Chloride 109 (H) 11/11/2020 04:22 AM    CO2 25 11/11/2020 04:22 AM    Anion gap 6 11/11/2020 04:22 AM    Glucose 140 (H) 11/11/2020 04:22 AM    BUN 14 11/11/2020 04:22 AM    Creatinine 0.94 11/11/2020 04:22 AM    BUN/Creatinine ratio 15 11/11/2020 04:22 AM    GFR est AA >60 11/11/2020 04:22 AM    GFR est non-AA >60 11/11/2020 04:22 AM Calcium 8.7 11/11/2020 04:22 AM    Bilirubin, total 0.3 11/29/2015 12:04 AM    Alk. phosphatase 62 11/29/2015 12:04 AM    Protein, total 8.2 11/29/2015 12:04 AM    Albumin 3.5 11/29/2015 12:04 AM    Globulin 4.7 (H) 11/29/2015 12:04 AM    A-G Ratio 0.7 (L) 11/29/2015 12:04 AM    ALT (SGPT) 14 11/29/2015 12:04 AM     Lab Results   Component Value Date/Time    ALT (SGPT) 14 11/29/2015 12:04 AM       Factors affecting BG pattern  Factor Dose Comments   Nutrition:  Carb-controlled meals   60 grams/meal    Drugs:  Steroids Dexamethasone 4mg x1 in OR at 0729 11/10    Pain S/p hysterectomy      Blood glucose pattern        Assessment and Plan   Nursing Diagnosis Risk for unstable blood glucose pattern   Nursing Intervention Domain 5259 Decision-making Support   Nursing Interventions Examined current inpatient diabetes control   Explored factors facilitating and impeding inpatient management  Identified self-management practices impeding diabetes control  Explored corrective strategies with patient and responsible inpatient provider   Informed patient of rational for insulin strategy while hospitalized  Instructed patient in:    Diabetes Education Checklist    Pathophysiology  [x] Diabetes basics  [x] Differences between type 1 and type 2    Diagnostic Criteria  [x] Interpretation of Labs  [x] Hemoglobin A1c  [x] Blood glucose goals  Notes: Goal A1C 7%, patients A1C 8.2%  Goal glucose under 200    Blood Glucose Monitoring  [x] Using a glucometer  [x] When to check BG  [x] Record keeping  Notes:   Patient instructed to obtain a glucose reading before meals and bedtime and if the \"don't feel right\"  Patient to create a log of blood sugar readings and present to their PCP for long term management.     Insulin  [x] Long-acting insulin  [x] Using insulin pens / vials  [x] Injection sites & site rotation  [x] Proper storage  [x] Insulin expiration guidelines  [x] Sharps disposal  Notes: Discussed the difference between long acting and short acting insulin  Used an insulin pen demo and simulated an insulin injection      Hypoglycemia  [x] Signs & symptoms  [x] Rule of 15 and other treatment  Notes: If patient feels dizzy, light headed or \"woozy\" patient to obtain a blood glucose reading. If blood glucose is under 70, patient instructed to drink 4-6 oz of milk, juice or regular soda then recheck 15 minutes later. If glucose under 70, repeat with another 4-6 oz regular juice/soda/milk. Once glucose over 70, eat a 15 gram snack like 1/2 peanut butter sandwich or meal.      Hyperglycemia  [x] Signs & symptoms      Physical Activity & Exercise  [x] Review of current routine  [x] Impact on BG levels  [x] BG targets for physical activity  [x] When to avoid physical activity  Notes: Patient instructed to increase activity every week once stabilized at home. Activity can consist of walking, chores around his house, garden, cut grass. Nutrition Basics  [x] Starter tips for meal planning  [x] Meal & snack schedule  [x] Reading food labels  [x] Balanced plate method  [x] How different foods impact BG levels  [x] Carbohydrate food sources  [x] Carbohydrate counting        [x] Low carb meal & snack options    Reducing Risks  [x] Long-term complications of diabetes  [x] Health Maintenance  [x] Healthy coping          Evaluation   [facility] 60[facility] with uncontrolled type two diabetes on metformin admitted for a total laproscopic hysterectomy with bilateral salpingo oophorectomy with cystoscopy due to menorrhagia and uterine fibroids. She did receive dexamethasone x1 in OR which elevated glucose to 290. Low dose lantus and metformin were started which brought glucose into goal of 100-180 this morning. Admitting A1C 8.2% and patient with good judgement and motivation. She verbalized that she is eager to adjust diabetes behaviors in an effort to optimize diabetes control.   Will be discharged home on Lantus to optimize wound heeling. May likely transition off insulin with combination of metformin/Lantus/diet/activity. Recommendations   Recommend:  Patient preparing for discharge. As duration of steroids has worn off, would not recommend discharging home on Lantus. 1. As A1C is elevated, she will need an appointment with PCP for ongoing management of diabetes. Patient to schedule a PCP follow-up appointment for ongoing diabetes management within 1-2 weeks of discharge. 2. Patient to obtain a blood glucose reading four times daily. First thing in the morning prior to eating and drinking anything then before lunch, dinner and bedtime. Create a log and present to PCP for interpretation. 3. Lantus PEN 20 units at bedtime. 4. Pen Needle, Diabetic 32 Gauge x 1/4\" (box of 100)     Billing Code(s)   I personally reviewed chart, notes, data and current medications in the medical record, and examined the patient at bedside before making care recommendations. Thank you for including us in their care. I spent 60 minutes in direct patient care today for this patient.   Time includes chart review, face to face with patient and collaboration with interdisciplinary care team.        Trish Tavares, CNS  Program for Diabetes Health  Access via [address] [place] [address]  [phone]

## 2021-04-01 ENCOUNTER — APPOINTMENT (OUTPATIENT)
Dept: PHYSICAL THERAPY | Age: 56
End: 2021-04-01
Payer: COMMERCIAL

## 2021-04-05 ENCOUNTER — HOSPITAL ENCOUNTER (OUTPATIENT)
Dept: PHYSICAL THERAPY | Age: 56
Discharge: HOME | End: 2021-04-05
Payer: COMMERCIAL

## 2021-04-05 PROCEDURE — 97161 PT EVAL LOW COMPLEX 20 MIN: CPT

## 2021-04-05 PROCEDURE — 97110 THERAPEUTIC EXERCISES: CPT

## 2021-04-05 NOTE — PROGRESS NOTES
PT INITIAL EVALUATION NOTE - [facility] 2-15    Patient Name: Ranjit Heller  Date:i  : 1965  [x]  Patient  Verified  Payor: BLUE CROSS / Plan: [employer] [facility] [place] / Product Type: PPO /    In time: 1:05 pm  Out time: 1:54  Total Treatment Time (min): 49  Total Timed Codes (min): 9  1:1 Treatment Time ( only): 9   Visit #: 1     Treatment Area: Left ankle pain [M25.572]    SUBJECTIVE  Pain Level (0-10 scale): 2  Any medication changes, allergies to medications, adverse drug reactions, diagnosis change, or new procedure performed?: [] No    [x] Yes (see summary sheet for update)  Subjective:    Pt was referred to skilled PT for left ankle pain. Today, Pt reports primary complaints of chronic intermittent ankle pain along the joint line. Pain is significant and sharp and surrounds whole ankle. She denies any swelling. \"I feel like it needs to pop at night. \"  She does have peripheral neuropathy. Mechanism of Injury: Pt tripped over her dog in the dark 2 years ago and sprained her left ankle. She was initially using crutches for a couple weeks and then used a brace for a couple months afterwards. Symptoms have been pretty consistent since initial injury. No prior history. Aggravating factors include prolonged walking, prolonged standing, wearing heels. Relieving factors include sitting, rest.       PLOF: Water aerobics at [facility] 3x/week; walking 2-3 miles 1x/week. 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       Pt Goals: Get rid of or improve pain in ankle; walk without pain or restriction     OBJECTIVE/EXAMINATION  Posture: Pes planus R>L; resting position of 5 degrees of inversion bilaterally in prone  Other Observations:  --  Gait and Functional Mobility:  Mild L trendelenburg, bilateral tibial ER, arch collapse  Squats: bilateral arch collapse, bilateral knee pain  Tandem: 30\" bilaterally  L tandem foam: 8\" (avg 3 trials)  R tandem foam: 12\" (avg 3 trials)  R SLS: 19\"  L SLS: 12\"    Palpation: TTP L ATFL, gastroc-soleus complex bilaterally    L ankle  ROM:  AROM   PROM    Flexion /DF  -10   -3   Extension/PF  45   50   IR/Sup/Ev  2   4   ER/Pron/Inv  15   25    R ankle ROM:  AROM   PROM   Flexion /DF  -5   -2   Extension/PF  42   45   IR/Sup/Ev  Lacking 3  2   ER/Pron/Inv  15   25      Joint Mobility Assessment: A-P talocrural hypomobility L>R    Flexibility: Decreased gastroc-soleus flexibility bilaterally    LOWER QUARTER   MUSCLE STRENGTH  KEY       R  L  0 - No Contraction  Knee ext  5  5  1 - Trace            flex  5  5  2 - Poor   Hip ext   4  4-  3 - Fair          flex   4  4  4 - Good         abd  3+  3+  5 - Normal         add  NT  NT      Ankle DF  5  4+                PF  5  5                INV  5  5                EV  5  5    Neurological: Reflexes / Sensations: NT  Special Tests: Inversion stress test: (+) L    Talar tilt: (+) L    9 min Therapeutic Exercise:  [] See flow sheet :   Rationale: increase ROM, increase strength and increase proprioception to improve the patients ability to ambulate with decreased pain or discomfort. With   [] TE   [] TA   [] Neuro   [] SC   [] other: Patient Education: [x] Review HEP    [] Progressed/Changed HEP based on:   [] positioning   [] body mechanics   [] transfers   [] heat/ice application    [x] other: Educated Pt regarding impairments, role of PT, and POC.        Other Objective/Functional Measures: FOTO Functional Measure: 60/100                      Pain Level (0-10 scale) post treatment: 2    ASSESSMENT/Changes in Function:     [x]  See Plan of [address], PT, DPT 4/5/2021

## 2021-04-05 NOTE — PROGRESS NOTES
Physical Therapy at [facility],   a part of [address] [place] [address] [facility], [address] [place] [address], [facility] Mehran Deepthi   Phone: [phone]  Fax: [phone]    Plan of Care/Statement of Necessity for Physical Therapy Services  2-15    Patient name: Radha Martinez  : 1965  Provider#: [id number]  Referral source: Tasha Figueroa NP      Medical/Treatment Diagnosis: Left ankle pain [M25.572]     Prior Hospitalization: see medical history     Comorbidities: HTN, hyperlipidemia, peripheral neuropathy, Diabetes type II, heart failure (2019), Anxiety, Morbid obesity   Prior Level of Function: Water aerobics, walking   Medications: Verified on Patient Summary List    Start of Care: 19      Onset Date: Chronic (2 years)       The Plan of Care and following information is based on the information from the initial evaluation. Assessment/ key information: Pt is a very pleasant and motivated 60year old female who was referred to skilled PT for left ankle pain. Pt reports primary complaints of intermittent significant ankle pain, particularly with standing or walking. Based on examination, patient presents with symptoms consistent with a chronic ankle sprain with various associated impairments at this point including TTP along ATFL, talocrural hypomobility, decreased dorsiflexion A/PROM, impaired gait mechanics, decreased hip/lower extremity strength, and impaired proprioceptive input.      Evaluation Complexity History MEDIUM  Complexity : 1-2 comorbidities / personal factors will impact the outcome/ POC ; Examination LOW Complexity : 1-2 Standardized tests and measures addressing body structure, function, activity limitation and / or participation in recreation  ;Presentation LOW Complexity : Stable, uncomplicated  ;Clinical Decision Making MEDIUM Complexity : FOTO score of 26-74  Overall Complexity Rating: LOW     Problem List: pain affecting function, decrease ROM, decrease strength, impaired gait/ balance, decrease ADL/ functional abilitiies, decrease activity tolerance and decrease flexibility/ joint mobility   Treatment Plan may include any combination of the following: Therapeutic exercise, Therapeutic activities, Neuromuscular re-education, Physical agent/modality, Gait/balance training, Manual therapy, Patient education, Self Care training and Functional mobility training  Patient / Family readiness to learn indicated by: asking questions, trying to perform skills and interest  Persons(s) to be included in education: patient (P)  Barriers to Learning/Limitations: None  Patient Goal (s): [place] pain  Patient Self Reported Health Status: good  Rehabilitation Potential: fair    Short and Long Term Goals: To be accomplished in 16 treatments:   1. Pt will be independent and compliant with HEP. 2. Pt will improve FOTO score by the MCID from 60 to >/= 71 demonstrating improved overall function with decreased pain or discomfort. 3. Pt will be able to navigate stairs without complaints of increased pain or discomfort. 4. Pt will be able to ambulate >/=1 hour without complaints of increased left ankle pain. 5. Pt will be able to balance in tandem stance on compliant surface >/= 30 seconds bilaterally demonstrating improved proprioception. 6. Pt will be able to balance in SLS on firm surface >/= 30 seconds demonstrating improved proprioception. Frequency / Duration: Patient to be seen 1-2 times per week for 16 treatments. Patient/ Caregiver education and instruction: self care, activity modification and exercises    [x]  Plan of care has been reviewed with JILLIAN Leggett PT, DPT 4/5/2021     ________________________________________________________________________    I certify that the above Therapy Services are being furnished while the patient is under my care. I agree with the treatment plan and certify that this therapy is necessary.     [facility] Signature:____________________  Date:____________Time: _________      Alfredo Coleman NP

## 2021-04-12 ENCOUNTER — HOSPITAL ENCOUNTER (OUTPATIENT)
Dept: PHYSICAL THERAPY | Age: 56
Discharge: HOME | End: 2021-04-12
Payer: COMMERCIAL

## 2021-04-12 PROCEDURE — 97110 THERAPEUTIC EXERCISES: CPT

## 2021-04-12 PROCEDURE — 97140 MANUAL THERAPY 1/> REGIONS: CPT

## 2021-04-12 PROCEDURE — 97112 NEUROMUSCULAR REEDUCATION: CPT

## 2021-04-12 NOTE — PROGRESS NOTES
PT DAILY TREATMENT NOTE - [facility] 2-15    Patient Name: Celso Guajardo  Date:2021  : 1965  [x]  Patient  Verified  Payor: Caleb Earing / Plan:  [facility] [place] / Product Type: PPO /    In time:8:35 am  Out time:9:27  Total Treatment Time (min): 52  Total Timed Codes (min): 52  1:1 Treatment Time ( only): 39   Visit #:  2    Treatment Area: Left ankle pain [M25.572]    SUBJECTIVE  Pain Level (0-10 scale):0/10  Any medication changes, allergies to medications, adverse drug reactions, diagnosis change, or new procedure performed?: [x] No    [] Yes (see summary sheet for update)  Subjective functional status/changes:   [] No changes reported  Patient states that walking can make pain worse      OBJECTIVE        25 min Therapeutic Exercise:  [x] See flow sheet :   Rationale: increase ROM, increase strength, improve coordination, improve balance and increase proprioception to improve the patients ability to improve gait pattern during ambulation. 10 min Neuromuscular Re-education:  [x]  See flow sheet : SLS cues for core stability   Rationale: increase ROM, increase strength, improve coordination, improve balance and increase proprioception  to improve the patients ability to improve coordination with ambulation. 15 min Manual Therapy: A-P left talocrural grade III mobs, MFR/STM left gastroc/soleus    Rationale: decrease pain, increase ROM, increase tissue extensibility and decrease trigger points to improve the patients ability to increase dorsiflexion of ankle during gait.                 With   [] TE   [] TA   [] Neuro   [] SC   [] other: Patient Education: [x] Review HEP    [] Progressed/Changed HEP based on:   [] positioning   [] body mechanics   [] transfers   [] heat/ice application    [] other:      Other Objective/Functional Measures: AROM left ankle dorsiflexion:4   PROM left ankle dorsiflexion: 5    Pain Level (0-10 scale) post treatment: 0/10    ASSESSMENT/Changes in Function: Patient tolerated new exercises well. Pt was unable to perform standing balance on the BAPS boards without compensation, so had patient in seated position instead to allow for full ROM and proper body mechanics during exercise. Cued patient to not use knee for inversion and eversion motion. Patient will continue to benefit from skilled PT services to modify and progress therapeutic interventions, address functional mobility deficits, address ROM deficits, address strength deficits, analyze and address soft tissue restrictions, analyze and cue movement patterns, analyze and modify body mechanics/ergonomics and assess and modify postural abnormalities to attain remaining goals. []  See Plan of Care  []  See progress note/recertification  []  See Discharge Summary         Progress towards goals / Updated goals:    Short and Long Term Goals: To be accomplished in 16 treatments:               1. Pt will be independent and compliant with HEP. 2. Pt will improve FOTO score by the MCID from 60 to >/= 71 demonstrating improved overall function with decreased pain or discomfort. 3. Pt will be able to navigate stairs without complaints of increased pain or discomfort. 4. Pt will be able to ambulate >/=1 hour without complaints of increased left ankle pain. 5. Pt will be able to balance in tandem stance on compliant surface >/= 30 seconds bilaterally demonstrating improved proprioception. 6. Pt will be able to balance in SLS on firm surface >/= 30 seconds demonstrating improved proprioception.     Frequency / Duration: Patient to be seen 1-2 times per week for 16 treatments.     PLAN  [x]  Upgrade activities as tolerated     [x]  Continue plan of care  []  Update interventions per flow sheet       []  Discharge due to:_  []  Other:_      Stuart Spence, PTA, OPTA 4/12/2021

## 2021-04-14 ENCOUNTER — APPOINTMENT (OUTPATIENT)
Dept: PHYSICAL THERAPY | Age: 56
End: 2021-04-14
Payer: COMMERCIAL

## 2021-04-17 ENCOUNTER — HOSPITAL ENCOUNTER (EMERGENCY)
Age: 56
Discharge: HOME | End: 2021-04-17
Attending: EMERGENCY MEDICINE
Payer: COMMERCIAL

## 2021-04-17 VITALS
TEMPERATURE: 98.4 F | RESPIRATION RATE: 16 BRPM | HEART RATE: 73 BPM | SYSTOLIC BLOOD PRESSURE: 149 MMHG | OXYGEN SATURATION: 99 % | DIASTOLIC BLOOD PRESSURE: 82 MMHG

## 2021-04-17 DIAGNOSIS — T50.Z95A ADVERSE EFFECT OF VACCINE, INITIAL ENCOUNTER: ICD-10-CM

## 2021-04-17 DIAGNOSIS — M79.10 MYALGIA: Primary | ICD-10-CM

## 2021-04-17 PROCEDURE — 99282 EMERGENCY DEPT VISIT SF MDM: CPT

## 2021-04-17 RX ORDER — NAPROXEN 500 MG/1
500 TABLET ORAL
Qty: 20 TAB | Refills: 0 | Status: SHIPPED | OUTPATIENT
Start: 2021-04-17 | End: 2021-04-27

## 2021-04-17 RX ORDER — METHOCARBAMOL 500 MG/1
500 TABLET, FILM COATED ORAL SEE ADMIN INSTRUCTIONS
Qty: 30 TAB | Refills: 0 | Status: SHIPPED | OUTPATIENT
Start: 2021-04-17

## 2021-04-18 NOTE — ED NOTES
MD reviewed discharge instructions and options with patient and patient verbalized understanding. RN reviewed discharge instructions using teachback method. Pt ambulated to exit without difficulty and in no signs of acute distress escorted by female , and she  will drive home. No complaints or needs expressed at this time. Patient was counseled on medications prescribed at discharge. VSS, verbalized relief from most intense pain. Patient to call her PCP in the morning for appointment.

## 2021-04-18 NOTE — ED TRIAGE NOTES
Triage: Pt arrives ambulatory from home with CC of neck pain that radiates into the back of her head and bilateral shoulders. The pain started Tuesday. Pt denies any specific injury or trauma. The pain is worsened by movement and heat. The pain did not respond to Aleeve. The pain is not alleviated by anything she has tried thus far. She reports she got her second Moderna vaccine on Tuesday prior to symptom onset.

## 2021-04-19 ENCOUNTER — HOSPITAL ENCOUNTER (OUTPATIENT)
Dept: PHYSICAL THERAPY | Age: 56
Discharge: HOME | End: 2021-04-19
Payer: COMMERCIAL

## 2021-04-19 PROCEDURE — 97112 NEUROMUSCULAR REEDUCATION: CPT

## 2021-04-19 PROCEDURE — 97110 THERAPEUTIC EXERCISES: CPT

## 2021-04-19 PROCEDURE — 97140 MANUAL THERAPY 1/> REGIONS: CPT

## 2021-04-19 NOTE — PROGRESS NOTES
PT DAILY TREATMENT NOTE - [facility] 2-15    Patient Name: Radha Martinez  Date:2021  : 1965  [x]  Patient  Verified  Payor: BLUE CROSS / Plan: [employer] [facility] [place] / Product Type: PPO /    In time:10:13A  Out time:11:06 A  Total Treatment Time (min): 53  Total Timed Codes (min): 53  1:1 Treatment Time ( only): 48   Visit #:  3    Treatment Area: Left ankle pain [M25.572]    SUBJECTIVE  Pain Level (0-10 scale): 0/10  Any medication changes, allergies to medications, adverse drug reactions, diagnosis change, or new procedure performed?: [x] No    [] Yes (see summary sheet for update)  Subjective functional status/changes:   [] No changes reported  Patient reports sharp shooting pain at random times, but she feels better when she is here. No pain today. The Student Physical Therapist Assistant participated in the delivery of services under the direction of Tamera Brian OPTA; directing the service, making the skilled judgment, and who is responsible for the supervision of treatment. OBJECTIVE    23 min Therapeutic Exercise:  [x] See flow sheet :   Rationale: increase ROM, increase strength, improve coordination, improve balance and increase proprioception to improve the patients ability to normalize gait pattern     10 min Neuromuscular Re-education:  [x]  See flow sheet : rocker board with balance, tandem balance   Rationale: improve balance and increase proprioception  to improve the patients ability to increase function and mobility. 15 min Manual Therapy: Left A-P left talocrural grade III mobs, rhythmic stabilization of left ankle    Rationale: decrease pain and increase ROM to improve the patients ability to decrease pain with  movement.              With   [] TE   [] TA   [] Neuro   [] SC   [] other: Patient Education: [x] Review HEP    [] Progressed/Changed HEP based on:   [] positioning   [] body mechanics   [] transfers   [] heat/ice application    [] other:      Other Objective/Functional Measures: --     Pain Level (0-10 scale) post treatment: 0/10    ASSESSMENT/Changes in Function:   Patient able to keep correct body mechanics with anterior and posterior movements and balance training while on rocker board. Patient tolerated tandme stance and adding foam next session to increase difficulty. Will try mobilization with belt next session. Patient will continue to benefit from skilled PT services to modify and progress therapeutic interventions, address functional mobility deficits, address ROM deficits, address strength deficits, analyze and address soft tissue restrictions, analyze and cue movement patterns, analyze and modify body mechanics/ergonomics and assess and modify postural abnormalities to attain remaining goals. []  See Plan of Care  []  See progress note/recertification  []  See Discharge Summary         Progress towards goals / Updated goals:    Short and Long Term Goals: To be accomplished in 16 treatments:               1. Pt will be independent and compliant with HEP.              2. Pt will improve FOTO score by the MCID from 60 to >/= 71 demonstrating improved overall function with decreased pain or discomfort.                3. Pt will be able to navigate stairs without complaints of increased pain or discomfort.               4. Pt will be able to ambulate >/=1 hour without complaints of increased left ankle pain.               5. Pt will be able to balance in tandem stance on compliant surface >/= 30 seconds bilaterally demonstrating improved proprioception.               6. Pt will be able to balance in SLS on firm surface >/= 30 seconds demonstrating improved proprioception.     PLAN  [x]  Upgrade activities as tolerated     [x]  Continue plan of care  []  Update interventions per flow sheet       []  Discharge due to:_  []  Other:_      ROSA Shah 4/19/2021

## 2021-04-21 ENCOUNTER — HOSPITAL ENCOUNTER (OUTPATIENT)
Dept: PHYSICAL THERAPY | Age: 56
Discharge: HOME | End: 2021-04-21
Payer: COMMERCIAL

## 2021-04-21 PROCEDURE — 97112 NEUROMUSCULAR REEDUCATION: CPT

## 2021-04-21 PROCEDURE — 97110 THERAPEUTIC EXERCISES: CPT

## 2021-04-21 PROCEDURE — 97140 MANUAL THERAPY 1/> REGIONS: CPT

## 2021-04-21 NOTE — PROGRESS NOTES
PT DAILY TREATMENT NOTE - [facility] 2-15    Patient Name: Alison Suero  Date:2021  : 1965  [x]  Patient  Verified  Payor: BLUE CROSS / Plan:  [facility] / Product Type: PPO /    In time:10:20 am  Out time:11:03  Total Treatment Time (min): 43  Total Timed Codes (min): 43  1:1 Treatment Time ( only): 40   Visit #: 4    Treatment Area: Left ankle pain [M25.572]    SUBJECTIVE  Pain Level (0-10 scale): 0/10  Any medication changes, allergies to medications, adverse drug reactions, diagnosis change, or new procedure performed?: [x]? No    []? Yes (see summary sheet for update)  Subjective functional status/changes:   []? No changes reported  Pt reports she continues to have periodic episodes of pain; has been fine yesterday and today.     OBJECTIVE     14 min Therapeutic Exercise:  [x]? See flow sheet :   Rationale: increase ROM, increase strength, improve coordination, improve balance and increase proprioception to improve the patients ability to normalize gait pattern     15 min Neuromuscular Re-education:  [x]? See flow sheet : rocker board with balance, tandem balance on foam; SLS   Rationale: improve balance and increase proprioception  to improve the patients ability to increase function and mobility.    14 min Manual Therapy: Left A-P left talocrural grade III mobs, IASTM of left gastroc-soleus complex in prone   Rationale: decrease pain and increase ROM to improve the patients ability to decrease pain with  movement.                                                                     With   []? TE   []? TA   []? Neuro   []? SC   []? other: Patient Education: [x]? Review HEP    []? Progressed/Changed HEP based on:   []? positioning   []? body mechanics   []? transfers   []? heat/ice application    []?  other:       Other Objective/Functional Measures: --        Pain Level (0-10 scale) post treatment: 0/10     ASSESSMENT/Changes in Function:   Restriction noted in gastroc-soleus complex with dorsiflexion PROM; utilized IASTM along musculature and reinforced with standing stretches. Pt tolerated stabilization/proprioception training well without increased foot/ankle pain; more challenged in M-L direction on rockerboard and L SLS. Continue progressing as tolerated. Patient will continue to benefit from skilled PT services to modify and progress therapeutic interventions, address functional mobility deficits, address ROM deficits, address strength deficits, analyze and address soft tissue restrictions, analyze and cue movement patterns, analyze and modify body mechanics/ergonomics and assess and modify postural abnormalities to attain remaining goals. []? See Plan of Care  []? See progress note/recertification  []? See Discharge Summary         Progress towards goals / Updated goals:     Short and Long Term Goals: To be accomplished in 16 treatments:               1. Pt will be independent and compliant with HEP.              2. Pt will improve FOTO score by the MCID from 60 to >/= 71 demonstrating improved overall function with decreased pain or discomfort.                3. Pt will be able to navigate stairs without complaints of increased pain or discomfort.               4. Pt will be able to ambulate >/=1 hour without complaints of increased left ankle pain.               5. Pt will be able to balance in tandem stance on compliant surface >/= 30 seconds bilaterally demonstrating improved proprioception.               6. Pt will be able to balance in SLS on firm surface >/= 30 seconds demonstrating improved proprioception.     PLAN  [x]? Upgrade activities as tolerated     [x]? Continue plan of care  []? Update interventions per flow sheet       []? Discharge due to:_  []?   Other:_        Alan Ann, PT, DPT 4/21/2021

## 2021-04-21 NOTE — ED PROVIDER NOTES
27-year-old female presents to the emergency department complaining of neck and upper back pain in her bilateral shoulders which started on Tuesday after she received the COVID-19 vaccine. She denies any trauma or specific injury to the area. She states that her pain is worse with movement and palpation to the area. She took a dose of Aleve yesterday with no significant improvement in her symptoms. She has not taken anything for symptoms today. She denies any numbness, weakness, fever, chest pain, shortness of breath, or any other medical concerns. Past Medical History:   Diagnosis Date    Adverse effect of anesthesia     'I HAVE  PROBLEM COMING OUT OF IT\" TAKES LONGER    Diabetes ([address]     TYPE II    Hypertension     Ill-defined condition     HYPERCHOLESTEROL    Sleep apnea     \"many years ago\" NO C-PAP USED       Past Surgical History:   Procedure Laterality Date    BREAST SURGERY PROCEDURE UNLISTED      KAREN. BREAST REDUCTION    HX GI      COLONOSCOPY  & ENDOSCOPY    HX GYN           HX GYN  2010    REMOVAL OF FIBROIDS    HX HEENT      EXTRACTION OF WISDOM TEETH X 4    HX OTHER SURGICAL      HEMORROIDECTOMY    HX OTHER SURGICAL      ANAL FISSURE REPAIR         Family History:   Problem Relation Age of Onset    Hypertension Mother     Diabetes Mother     Hypertension Father        Social History     Socioeconomic History    Marital status:      Spouse name: Not on file    Number of children: Not on file    Years of education: Not on file    Highest education level: Not on file   Occupational History    Not on file   Social Needs    Financial resource strain: Not on file    Food insecurity     Worry: Not on file     Inability: Not on file    Transportation needs     Medical: Not on file     Non-medical: Not on file   Tobacco Use    Smoking status: Never Smoker    Smokeless tobacco: Never Used   Substance and Sexual Activity    Alcohol use:  Yes Comment: OCCASSIONALLY    Drug use: No    Sexual activity: Not on file   Lifestyle    Physical activity     Days per week: Not on file     Minutes per session: Not on file    Stress: Not on file   Relationships    Social connections     Talks on phone: Not on file     Gets together: Not on file     Attends Confucianism service: Not on file     Active member of club or organization: Not on file     Attends meetings of clubs or organizations: Not on file     Relationship status: Not on file    Intimate partner violence     Fear of current or ex partner: Not on file     Emotionally abused: Not on file     Physically abused: Not on file     Forced sexual activity: Not on file   Other Topics Concern    Not on file   Social History Narrative    Not on file         ALLERGIES: Patient has no known allergies. Review of Systems   Constitutional: Positive for activity change. Negative for appetite change, chills and fever. HENT: Negative for congestion, rhinorrhea, sinus pressure, sneezing and sore throat. Eyes: Negative for photophobia and visual disturbance. Respiratory: Negative for cough and shortness of breath. Cardiovascular: Negative for chest pain. Gastrointestinal: Negative for abdominal pain, blood in stool, constipation, diarrhea, nausea and vomiting. Genitourinary: Negative for difficulty urinating, dysuria, flank pain, frequency, hematuria, menstrual problem, urgency, vaginal bleeding and vaginal discharge. Musculoskeletal: Positive for back pain, myalgias and neck pain. Negative for arthralgias. Skin: Negative for rash and wound. Neurological: Negative for syncope, weakness, numbness and headaches. Psychiatric/Behavioral: Negative for self-injury and suicidal ideas. All other systems reviewed and are negative. Vitals:    04/17/21 2206   BP: (!) 149/82   Pulse: 73   Resp: 16   Temp: 98.4 °F (36.9 °C)   SpO2: 99%            Physical Exam  Vitals signs and nursing note reviewed. Constitutional:       General: She is not in acute distress. Appearance: Normal appearance. She is well-developed. She is not diaphoretic. HENT:      Head: Normocephalic and atraumatic. Nose: Nose normal.   Eyes:      Extraocular Movements: Extraocular movements intact. Conjunctiva/sclera: Conjunctivae normal.      Pupils: Pupils are equal, round, and reactive to light. Neck:      Musculoskeletal: Neck supple. Cardiovascular:      Rate and Rhythm: Normal rate and regular rhythm. Heart sounds: Normal heart sounds. Pulmonary:      Effort: Pulmonary effort is normal.      Breath sounds: Normal breath sounds. Abdominal:      General: There is no distension. Palpations: Abdomen is soft. Tenderness: There is no abdominal tenderness. Musculoskeletal:      Cervical back: She exhibits tenderness and spasm. Back:    Skin:     General: Skin is warm and dry. Neurological:      General: No focal deficit present. Mental Status: She is alert and oriented to person, place, and time. Cranial Nerves: No cranial nerve deficit. Sensory: No sensory deficit. Motor: No weakness. Coordination: Coordination normal.          MDM   57-year-old female presents with myalgias and muscle tightness and spasm in bilateral trapezius after receiving the COVID-19 vaccine. She is afebrile with vital signs stable no acute distress. No history of trauma or bony tenderness, do not feel that imaging is necessary at this time. Feel this is likely expected adverse effect of COVID-19 vaccination and should resolve on its own over the next few days. Will Rx Naprosyn and Robaxin for further symptomatic relief. Recommended PCP follow-up as needed and return precautions were given for worsening or concerns.     Procedures

## 2021-04-26 ENCOUNTER — HOSPITAL ENCOUNTER (OUTPATIENT)
Dept: PHYSICAL THERAPY | Age: 56
Discharge: HOME | End: 2021-04-26
Payer: COMMERCIAL

## 2021-04-26 PROCEDURE — 97140 MANUAL THERAPY 1/> REGIONS: CPT

## 2021-04-26 PROCEDURE — 97110 THERAPEUTIC EXERCISES: CPT

## 2021-04-26 PROCEDURE — 97112 NEUROMUSCULAR REEDUCATION: CPT

## 2021-04-26 NOTE — PROGRESS NOTES
PT DAILY TREATMENT NOTE - [facility] 2-15    Patient Name: Ranjit Heller  Date:2021  : 1965  [x]  Patient  Verified  Payor: BLUE CROSS / Plan:  [facility] [place] / Product Type: PPO /    In time:9:18A Out time:10:16A  Total Treatment Time (min): 58  Total Timed Codes (min): 58  1:1 Treatment Time ( only): 48   Visit #:  5    Treatment Area: Left ankle pain [M25.572]    SUBJECTIVE  Pain Level (0-10 scale): 0/10  Any medication changes, allergies to medications, adverse drug reactions, diagnosis change, or new procedure performed?: [x] No    [] Yes (see summary sheet for update)  Subjective functional status/changes:   [] No changes reported  Patient said felt no pain over the weekend and she thinks the strengthening exercises are helping her ankle. The Student Physical Therapist Assistant participated in the delivery of services under the direction of Tamera Pulido OPTA; directing the service, making the skilled judgment, and who is responsible for the supervision of treatment. OBJECTIVE    30 min Therapeutic Exercise:  [x] See flow sheet :   Rationale: increase strength, improve coordination, improve balance and increase proprioception to improve the patients ability to normalize gait pattern. 10 min Neuromuscular Re-education:  [x]  See flow sheet : rocker board with balance, SLS with tactile cues   Rationale: increase strength, improve coordination, improve balance and increase proprioception  to improve the patients ability to increase function and mobility. 18 min Manual Therapy: Left A-P left talocrural grade III mobs with dorsiflexion, IASTM of left gastroc-soleus complex in prone, MFR/STM to medial gastroc    Rationale: decrease pain, increase ROM, increase tissue extensibility and decrease trigger points to improve the patients ability to increase ROM in left ankle.           With   [] TE   [] TA   [] Neuro   [] SC   [] other: Patient Education: [x] Review HEP    [] Progressed/Changed HEP based on:   [] positioning   [] body mechanics   [] transfers   [] heat/ice application    [] other:      Other Objective/Functional Measures: --     Pain Level (0-10 scale) post treatment: 0/10    ASSESSMENT/Changes in Function:   Patient tolerated treatment well. Patient started to feel pain in low back during SLS. Tactile and verbal cues used to activate patient's glut. She reported less pain on her second SLS. Patient will continue to benefit from skilled PT services to modify and progress therapeutic interventions, address functional mobility deficits, address ROM deficits, address strength deficits, analyze and address soft tissue restrictions, analyze and cue movement patterns, analyze and modify body mechanics/ergonomics and assess and modify postural abnormalities to attain remaining goals. []  See Plan of Care  []  See progress note/recertification  []  See Discharge Summary         Progress towards goals / Updated goals:    Short and Long Term Goals: To be accomplished in 16 treatments:               1. Pt will be independent and compliant with HEP.              2. Pt will improve FOTO score by the MCID from 60 to >/= 71 demonstrating improved overall function with decreased pain or discomfort.                3. Pt will be able to navigate stairs without complaints of increased pain or discomfort.               4. Pt will be able to ambulate >/=1 hour without complaints of increased left ankle pain.               5.  Pt will be able to balance in tandem stance on compliant surface >/= 30 seconds bilaterally demonstrating improved proprioception.               6. Pt will be able to balance in SLS on firm surface >/= 30 seconds demonstrating improved proprioception    PLAN  [x]  Upgrade activities as tolerated     [x]  Continue plan of care  []  Update interventions per flow sheet       []  Discharge due to:_  []  Other:_      Angela Feliciano, [facility] 4/26/2021

## 2021-04-28 ENCOUNTER — HOSPITAL ENCOUNTER (OUTPATIENT)
Dept: PHYSICAL THERAPY | Age: 56
Discharge: HOME | End: 2021-04-28
Payer: COMMERCIAL

## 2021-04-28 PROCEDURE — 97110 THERAPEUTIC EXERCISES: CPT

## 2021-04-28 PROCEDURE — 97140 MANUAL THERAPY 1/> REGIONS: CPT

## 2021-04-28 PROCEDURE — 97112 NEUROMUSCULAR REEDUCATION: CPT

## 2021-04-28 NOTE — PROGRESS NOTES
PT DAILY TREATMENT NOTE - [facility] 2-15    Patient Name: Liu Taylor  Date:2021  : 1965  [x]  Patient  Verified  Payor: BLUE CROSS / Plan: [employer] [facility] / Product Type: PPO /    In time:1:50P  Out time:2:49P  Total Treatment Time (min): 59  Total Timed Codes (min): 59  1:1 Treatment Time ( only): 52   Visit #: 6    Treatment Area: Left ankle pain [M25.572]    SUBJECTIVE  Pain Level (0-10 scale):0/10  Any medication changes, allergies to medications, adverse drug reactions, diagnosis change, or new procedure performed?: [x] No    [] Yes (see summary sheet for update)  Subjective functional status/changes:   [] No changes reported  Patient said she didn't have pain today, but she said she was in a bad mood. The Student Physical Therapist Assistant participated in the delivery of services under the direction of Tamera Eubanks OPTA; directing the service, making the skilled judgment, and who is responsible for the supervision of treatment. OBJECTIVE    34 min Therapeutic Exercise:  [x] See flow sheet :   Rationale: increase ROM, increase strength, improve coordination and improve balance to improve the patients ability to decrease pain with ADLs. 10 min Neuromuscular Re-education:  [x]  See flow sheet : rocker board with balance, verbal and manual cues for standing hip abduction/extension    Rationale: increase ROM, increase strength, improve coordination, improve balance and increase proprioception  to improve the patients ability to decrease pain with ambulation. 15 min Manual Therapy:  Left A-P left talocrural grade III mobs with dorsiflexion, fibular head grade II mob, IASTM of left gastroc-soleus complex in prone, MFR/STM to medial gastroc   Rationale: decrease pain, increase ROM, increase tissue extensibility and decrease trigger points to improve the patients ability to increase functional mobility.            With   [] TE   [] TA   [] Neuro   [] SC   [] other: Patient Education: [x] Review HEP    [] Progressed/Changed HEP based on:   [] positioning   [] body mechanics   [] transfers   [] heat/ice application    [] other:      Other Objective/Functional Measures: --     Pain Level (0-10 scale) post treatment: 0-0.5/10     ASSESSMENT/Changes in Function:   Patient tolerated treatment well today. Added standing abduction and extension to facilitate glut activation while still working on her left ankle stability. Patient said she was really able to feel her gluts working with the new exercise. Patient will continue to benefit from skilled PT services to modify and progress therapeutic interventions, address functional mobility deficits, address ROM deficits, address strength deficits, analyze and address soft tissue restrictions, analyze and cue movement patterns, analyze and modify body mechanics/ergonomics and assess and modify postural abnormalities to attain remaining goals. []  See Plan of Care  []  See progress note/recertification  []  See Discharge Summary         Progress towards goals / Updated goals:    Short and Long Term Goals: To be accomplished in 16 treatments:               1. Pt will be independent and compliant with HEP.              2. Pt will improve FOTO score by the MCID from 60 to >/= 71 demonstrating improved overall function with decreased pain or discomfort.                3. Pt will be able to navigate stairs without complaints of increased pain or discomfort.               4. Pt will be able to ambulate >/=1 hour without complaints of increased left ankle pain.               5.  Pt will be able to balance in tandem stance on compliant surface >/= 30 seconds bilaterally demonstrating improved proprioception.               6. Pt will be able to balance in SLS on firm surface >/= 30 seconds demonstrating improved proprioception    PLAN  [x]  Upgrade activities as tolerated     [x]  Continue plan of care  []  Update interventions per flow sheet       [] Discharge due to:_  []  Other:_      Levester Collet, SPTA 4/28/2021

## 2021-05-03 ENCOUNTER — APPOINTMENT (OUTPATIENT)
Dept: PHYSICAL THERAPY | Age: 56
End: 2021-05-03
Payer: COMMERCIAL

## 2021-05-05 ENCOUNTER — HOSPITAL ENCOUNTER (OUTPATIENT)
Dept: PHYSICAL THERAPY | Age: 56
Discharge: HOME | End: 2021-05-05
Payer: COMMERCIAL

## 2021-05-05 PROCEDURE — 97140 MANUAL THERAPY 1/> REGIONS: CPT

## 2021-05-05 PROCEDURE — 97112 NEUROMUSCULAR REEDUCATION: CPT

## 2021-05-05 PROCEDURE — 97110 THERAPEUTIC EXERCISES: CPT

## 2021-05-05 NOTE — PROGRESS NOTES
PT DAILY TREATMENT NOTE - [facility] 2-15    Patient Name: Ranjit Heller  Date:2021  : 1965  [x]  Patient  Verified  Payor: Iris Khan / Plan: [employer] Skiff / Product Type: PPO /    In time:10:24 am  Out time: 11:12 am  Total Treatment Time (min): 48  Total Timed Codes (min): 48  1:1 Treatment Time ( only): 43  Visit #: 7    Treatment Area: Left ankle pain [M25.572]    SUBJECTIVE  Pain Level (0-10 scale):0/10  Any medication changes, allergies to medications, adverse drug reactions, diagnosis change, or new procedure performed?: [x]? No    []? Yes (see summary sheet for update)  Subjective functional status/changes:   []? No changes reported  Patient reports she has not had pain over the last few days. She has not been able to be very diligent with her HEP over past week. OBJECTIVE     27 min Therapeutic Exercise:  [x]? See flow sheet :   Rationale: increase ROM, increase strength, improve coordination and improve balance to improve the patients ability to decrease pain with ADLs. 12 min Neuromuscular Re-education:  [x]? See flow sheet : rocker board with balance, SLS, tandem on foam    Rationale: increase ROM, increase strength, improve coordination, improve balance and increase proprioception  to improve the patients ability to decrease pain with ambulation.      9 min Manual Therapy:  Left A-P left talocrural grade III mobs with dorsiflexion, 1/2 kneel MWM into DF   Rationale: decrease pain, increase ROM, increase tissue extensibility and decrease trigger points to improve the patients ability to increase functional mobility. With   []? TE   []? TA   []? Neuro   []? SC   []? other: Patient Education: [x]? Review HEP    []? Progressed/Changed HEP based on:   []? positioning   []? body mechanics   []? transfers   []? heat/ice application    []?  other:       Other Objective/Functional Measures:   0-100: 60% improved - pain random with walking and other activities  FOTO Score: 78 (60 on eval)    L ankle  ROM:                        AROM                         PROM               Flexion /DF                  -10     -3                          -3   0              Extension/PF               45   53                                 L tandem foam: 8\" (avg 3 trials)  40+ seconds  R tandem foam: 12\" (avg 3 trials) 40+ seconds  R SLS: 19\" 40+ seconds  L SLS: 12\" 40+ seconds    R SLS on foam: 15\"  L SLS on foam: 9\"        Pain Level (0-10 scale) post treatment: 0-0.5/10      ASSESSMENT/Changes in Function:      []? See Plan of Care  [x]? See progress note/recertification  []? See Discharge Summary         Progress towards goals / Updated goals:     Short and Long Term Goals: To be accomplished in 16 treatments:               1. Pt will be independent and compliant with HEP. - Progressing               2. Pt will improve FOTO score by the MCID from 60 to >/= 71 demonstrating improved overall function with decreased pain or discomfort. - MET (78)               3. Pt will be able to navigate stairs without complaints of increased pain or discomfort. - MET               4. Pt will be able to ambulate >/=1 hour without complaints of increased left ankle pain. - Progressing               5. Pt will be able to balance in tandem stance on compliant surface >/= 30 seconds bilaterally demonstrating improved proprioception. - MET               6. Pt will be able to balance in SLS on firm surface >/= 30 seconds demonstrating improved proprioception  - MET     PLAN  [x]? Upgrade activities as tolerated     [x]? Continue plan of care  []? Update interventions per flow sheet       []? Discharge due to:_  []?   Other:_      Baldev Verma, PT, DPT 5/5/2021

## 2021-05-05 NOTE — PROGRESS NOTES
Physical Therapy at [facility],   a part of [facility], [facility]  [address] [address], [address] [place] [address], [address] [place] [address]  Phone: [phone] Fax: [phone]    Progress Note    Name: Alison Suero   : 1965   MD: Mayra Blankenship, NP       Treatment Diagnosis: Left ankle pain [M25.572]  Start of Care: 21    Visits from Start of Care: 7  Missed Visits: 0    Summary of Care:Ms. Cintia Feldman has been seen for 7 skilled physical therapy visits secondary to chronic left ankle pain. Pt is progressing well with her therapy program which had utilized therapeutic exercise, manual therapy techniques, and neuromuscular re-education. Pt is demonstrating much improved proprioception and balance at this point and is navigating stairs without complaints of increased pain. She notes pain is random, though is less frequent at this point. Pt would benefit from continued PT to further improve impairments and decrease pain with functional activity. Thank you for this referral!    Assessment / Recommendations: She notes pain is random, though is less frequent at this point. Pt would benefit from continued PT to further improve impairments and decrease pain with functional activity. 0-100: 60% improved - pain random with walking and other activities  FOTO Score: 78 (60 on eval)     (Evaluation vs Today)  L ankle  ROM:                        AROM                         PROM               Flexion /DF                  -10     -3                          -3   0              Extension/PF               45   53                                  L tandem foam: 8\" (avg 3 trials)  40+ seconds  R tandem foam: 12\" (avg 3 trials) 40+ seconds  R SLS: 19\" 40+ seconds  L SLS: 12\" 40+ seconds     R SLS on foam: 15\"  L SLS on foam: 9\"     Progress towards goals / Updated goals:     Short and Long Term Goals: To be accomplished in 16 treatments:               1.  Pt will be independent and compliant with HEP. - Progressing               2. Pt will improve FOTO score by the MCID from 60 to >/= 71 demonstrating improved overall function with decreased pain or discomfort. - MET (78)               3. Pt will be able to navigate stairs without complaints of increased pain or discomfort. - MET               4. Pt will be able to ambulate >/=1 hour without complaints of increased left ankle pain. - Progressing               5.  Pt will be able to balance in tandem stance on compliant surface >/= 30 seconds bilaterally demonstrating improved proprioception. - MET               6. Pt will be able to balance in SLS on firm surface >/= 30 seconds demonstrating improved proprioception  - MET    Abbey Selby, PT, DPT 5/5/2021

## 2021-07-06 NOTE — ANCILLARY DISCHARGE INSTRUCTIONS
Physical Therapy at [facility],   a part of [address] [place] [address], [address]  Phone: [phone]  Fax: [phone]    Discharge Summary  2-15    Patient name: Ren Boeck  : 1965  Provider#: [id number]  Referral source: Chel Santana NP      Medical/Treatment Diagnosis: Left ankle pain [M25.572]     Prior Hospitalization: see medical history     Comorbidities: HTN, hyperlipidemia, peripheral neuropathy, Diabetes type II, heart failure (2019), Anxiety, Morbid obesity   Prior Level of Function: Water aerobics, walking   Medications: Verified on Patient Summary List     Start of Care: 21                                                                       Onset Date: Chronic (2 years)      Visits from Start of Care: 7     Missed Visits: 1  Reporting Period : 21 to 21      ASSESSMENT/SUMMARY OF CARE: Ms. Lydia Bernheim was seen for a total of 7 skilled physical therapy visits secondary to chronic left ankle pain. Pt demonstrated good improvement over the course of her therapy program, noting she was feeling 60% improved upon her last attended visit and increased her FOTO score from 60 to 78 demonstrating improved function with less pain. She demonstrated improved proprioception and noted pain was less frequent and would occur randomly. She was not seen since her most recent progress note, and is thus discharged today, 21.  Thank you for this referral!    Per recent progress note:     0-100: 60% improved - pain random with walking and other activities  FOTO Score: 78 (60 on eval)     (Evaluation vs Progress note)  L ankle  ROM:                        AROM                         PROM               Flexion /DF                  -10     -3                          -3   0              Extension/PF               45   53                                  L tandem foam: 8\" (avg 3 trials)  40+ seconds  R tandem foam: 12\" (avg 3 trials) 40+ seconds  R SLS: 19\" 40+ seconds  L SLS: 12\" 40+ seconds     R SLS on foam: 15\"  L SLS on foam: 9\"    Short and Long Term Goals: To be accomplished in 16 treatments:               1. Pt will be independent and compliant with HEP.  - Progressing               2. Pt will improve FOTO score by the MCID from 60 to >/= 71 demonstrating improved overall function with decreased pain or discomfort. - MET (78)               3. Pt will be able to navigate stairs without complaints of increased pain or discomfort. - MET               4. Pt will be able to ambulate >/=1 hour without complaints of increased left ankle pain. - Not met               5.  Pt will be able to balance in tandem stance on compliant surface >/= 30 seconds bilaterally demonstrating improved proprioception. - MET               6. Pt will be able to balance in SLS on firm surface >/= 30 seconds demonstrating improved proprioception  - MET    RECOMMENDATIONS:  [x]Discontinue therapy: [x]Patient has reached or is progressing toward set goals      [x]Patient is non-compliant or has abdicated      []Due to lack of appreciable progress towards set goals    Eula Marcano, PT, DPT 7/6/2021

## 2022-03-19 PROBLEM — D25.9 FIBROID UTERUS: Status: ACTIVE | Noted: 2020-11-10

## 2022-03-19 PROBLEM — N92.0 MENORRHAGIA: Status: ACTIVE | Noted: 2020-11-10

## 2023-05-11 RX ORDER — INSULIN LISPRO 100 [IU]/ML
INJECTION, SOLUTION INTRAVENOUS; SUBCUTANEOUS
COMMUNITY
Start: 2020-11-11

## 2023-05-11 RX ORDER — INSULIN GLARGINE 100 [IU]/ML
INJECTION, SOLUTION SUBCUTANEOUS
COMMUNITY
Start: 2020-11-11

## 2023-05-11 RX ORDER — ATORVASTATIN CALCIUM 20 MG/1
TABLET, FILM COATED ORAL
COMMUNITY

## 2023-05-11 RX ORDER — IBUPROFEN 800 MG/1
TABLET ORAL EVERY 8 HOURS PRN
COMMUNITY
Start: 2020-11-11

## 2023-05-11 RX ORDER — METHOCARBAMOL 500 MG/1
TABLET, FILM COATED ORAL SEE ADMIN INSTRUCTIONS
COMMUNITY
Start: 2021-04-17

## 2023-05-11 RX ORDER — LISINOPRIL 5 MG/1
TABLET ORAL DAILY
COMMUNITY

## 2023-05-11 RX ORDER — FERROUS SULFATE 325(65) MG
TABLET ORAL
COMMUNITY

## 2023-05-11 RX ORDER — GABAPENTIN 300 MG/1
CAPSULE ORAL
COMMUNITY

## (undated) DEVICE — PAD,SANITARY,11 IN,MAXI,N-STRL,IND WRAP: Brand: MEDLINE

## (undated) DEVICE — FILTER SMK EVAC FLO CLR MEGADYNE

## (undated) DEVICE — PACK,BASIC,SIRUS,V: Brand: MEDLINE

## (undated) DEVICE — SUTURE MCRYL SZ 3-0 L27IN ABSRB UD L19MM PS-2 3/8 CIR PRIM Y427H

## (undated) DEVICE — ELECTRO LUBE IS A SINGLE PATIENT USE DEVICE THAT IS INTENDED TO BE USED ON ELECTROSURGICAL ELECTRODES TO REDUCE STICKING.: Brand: KEY SURGICAL ELECTRO LUBE

## (undated) DEVICE — SUTURE STRATAFIX SPRL PDS + SZ 2-0 L6IN ABSRB VLT L36MM SXPP1B409

## (undated) DEVICE — GARMENT,MEDLINE,DVT,INT,CALF,MED, GEN2: Brand: MEDLINE

## (undated) DEVICE — CYSTO/BLADDER IRRIGATION SET, REGULATING CLAMP

## (undated) DEVICE — SYR 10ML LUER LOK 1/5ML GRAD --

## (undated) DEVICE — TROCAR ENDOSCP L100MM DIA12MM STBL SL BLDELSS ENDOPATH XCEL

## (undated) DEVICE — COVER MPLR TIP CRV SCIS ACC DA VINCI

## (undated) DEVICE — VCARE MEDIUM, UTERINE MANIPULATOR, VAGINAL-CERVICAL-AHLUWALIA'S-RETRACTOR-ELEVATOR: Brand: VCARE

## (undated) DEVICE — VISUALIZATION SYSTEM: Brand: CLEARIFY

## (undated) DEVICE — STERILE POLYISOPRENE POWDER-FREE SURGICAL GLOVES WITH EMOLLIENT COATING: Brand: PROTEXIS

## (undated) DEVICE — STRAP,POSITIONING,KNEE/BODY,FOAM,4X60": Brand: MEDLINE

## (undated) DEVICE — APPLICATOR SEAL FLOSEAL 5MM+ --

## (undated) DEVICE — SURGICAL PROCEDURE PACK GYN LAPAROSCOPY CUST SMH LF

## (undated) DEVICE — INFECTION CONTROL KIT SYS

## (undated) DEVICE — REM POLYHESIVE ADULT PATIENT RETURN ELECTRODE: Brand: VALLEYLAB

## (undated) DEVICE — OBTRTR BLDELSS 8MM DISP -- DA VINCI - SNGL USE

## (undated) DEVICE — SYSTEM FASCIAL CLSR UNIQUE SHLDED WNG SAFE UNIF CONSISTENT

## (undated) DEVICE — TOTAL TRAY, DB, 100% SILI FOLEY, 16FR 10: Brand: MEDLINE

## (undated) DEVICE — DERMABOND SKIN ADH 0.7ML -- DERMABOND ADVANCED 12/BX

## (undated) DEVICE — SOLUTION IRRIGATION H2O 0797305] ICU MEDICAL INC]

## (undated) DEVICE — PREP SKN CHLRAPRP APL 26ML STR --

## (undated) DEVICE — NEEDLE HYPO 25GA L1.5IN BVL ORIENTED ECLIPSE

## (undated) DEVICE — DRAPE SURG EQUIP W105XH13XL20IN 3 ARM DISPOSABLE DA VINCI S

## (undated) DEVICE — DRAPE,REIN 53X77,STERILE: Brand: MEDLINE

## (undated) DEVICE — INSUFFLATION NEEDLE TO ESTABLISH PNEUMOPERITONEUM.: Brand: INSUFFLATION NEEDLE

## (undated) DEVICE — PAD BD MATTRESS 73X32 IN STD CONVOLUTED FOAM LTX FREE

## (undated) DEVICE — TUBING INSUF HEAT STRL 10 FT --

## (undated) DEVICE — Device

## (undated) DEVICE — TRAY PREP DRY W/ PREM GLV 2 APPL 6 SPNG 2 UNDPD 1 OVERWRAP